# Patient Record
Sex: MALE | Race: BLACK OR AFRICAN AMERICAN | Employment: FULL TIME | URBAN - METROPOLITAN AREA
[De-identification: names, ages, dates, MRNs, and addresses within clinical notes are randomized per-mention and may not be internally consistent; named-entity substitution may affect disease eponyms.]

---

## 2017-10-24 ENCOUNTER — OFFICE VISIT (OUTPATIENT)
Dept: FAMILY MEDICINE CLINIC | Facility: CLINIC | Age: 42
End: 2017-10-24

## 2017-10-24 VITALS
DIASTOLIC BLOOD PRESSURE: 80 MMHG | TEMPERATURE: 98 F | BODY MASS INDEX: 40.99 KG/M2 | HEIGHT: 65.5 IN | SYSTOLIC BLOOD PRESSURE: 128 MMHG | RESPIRATION RATE: 18 BRPM | OXYGEN SATURATION: 98 % | WEIGHT: 249 LBS | HEART RATE: 95 BPM

## 2017-10-24 DIAGNOSIS — K21.9 GERD WITHOUT ESOPHAGITIS: Primary | Chronic | ICD-10-CM

## 2017-10-24 DIAGNOSIS — E78.1 HYPERTRIGLYCERIDEMIA: ICD-10-CM

## 2017-10-24 DIAGNOSIS — E66.9 OBESITY (BMI 35.0-39.9 WITHOUT COMORBIDITY): Chronic | ICD-10-CM

## 2017-10-24 DIAGNOSIS — I47.1 PAROXYSMAL SVT (SUPRAVENTRICULAR TACHYCARDIA) (HCC): Chronic | ICD-10-CM

## 2017-10-24 DIAGNOSIS — R06.81 APNEA: ICD-10-CM

## 2017-10-24 DIAGNOSIS — Z86.79 HISTORY OF HYPERTENSION: ICD-10-CM

## 2017-10-24 PROCEDURE — 99204 OFFICE O/P NEW MOD 45 MIN: CPT | Performed by: FAMILY MEDICINE

## 2017-10-24 RX ORDER — OMEPRAZOLE 40 MG/1
40 CAPSULE, DELAYED RELEASE ORAL DAILY
Qty: 30 CAPSULE | Refills: 3 | Status: SHIPPED | OUTPATIENT
Start: 2017-10-24 | End: 2017-12-06

## 2017-10-24 RX ORDER — METOPROLOL SUCCINATE 25 MG/1
25 TABLET, EXTENDED RELEASE ORAL DAILY
Qty: 30 TABLET | Refills: 0 | Status: SHIPPED | OUTPATIENT
Start: 2017-10-24 | End: 2017-10-31

## 2017-10-24 NOTE — PATIENT INSTRUCTIONS
Perform labs fasting 8 hours with water or black coffee or or black tea diet  soda only prior to exam.    Tips to Control Acid Reflux    To control acid reflux, you’ll need to make some basic diet and lifestyle changes.  The simple steps outlined below ma There are muscles (esophageal sphincters) at both ends of the tube that carries food to your stomach (the esophagus). These muscles relax to let food pass. Then they tighten to keep stomach acid down.  When the lower esophageal sphincter (LES) doesn’t tight If your throat structures are too large or the muscles relax too much during sleep, the air passage may be partly blocked for a while (temporarily).  But over time the air gets past. As air from the nose or mouth passes around this blockage, the throat stru Most people with heart problems need to eat less salt (sodium). Reducing the amount of salt you eat may help control your blood pressure. The higher your blood pressure, the greater your risk for heart disease, stroke, blindness, and kidney problems.   At t © 1614-3416 [employer] [address]. 1407 [facility], [address] [place]. All rights reserved. This information is not intended as a substitute for professional medical care. Always follow your healthcare professional's instructions.         Low-Sal Ok: Ice cream, frozen yogurt, juice bars, gelatin, cookies and pies, sugar, honey, jelly, hard candy  Avoid: Most pies, cakes and cookies prepared or processed with salt; instant pudding  Drinks  Ok: Tea, coffee, fizzy (carbonated) drinks, juices  Avoid: F Eating for your heart doesn’t have to be hard or boring. You just need to know how to make healthier choices. The DASH eating plan has been developed to help you do just that. DASH stands for Dietary Approaches to Stop Hypertension.  It is a plan that has b Best choices: Lean poultry and fish. Trim away visible fat. Broil, grill, roast, or boil instead of frying. Remove skin from poultry before eating.  Limit how much red meat you eat.  Nuts, seeds, beans  Servings: 4 to 5 a week  A serving is:  · One-third cu PAT can occur in otherwise healthy people who have used too much of a stimulant like tobacco or caffeine. Caffeine is found in coffee, tea, cola and some medicines.  Some over-the-counter cold and sinus remedies, diet pills, and some herbal supplements can

## 2017-10-25 NOTE — PROGRESS NOTES
CHIEF COMPLAINT: Patient presents with:  Establish Care: F/U;elevated Heart Rate         HPI:     Junita Skiff is a 35year old male presents for establish care.     H/o PSVT 10/19/2017 taken to [facility] due to left arm pain, chest pressure and p Outpatient Prescriptions:  Omeprazole 40 MG Oral Capsule Delayed Release Take 1 capsule (40 mg total) by mouth daily. Disp: 30 capsule Rfl: 3   Metoprolol Succinate ER 25 MG Oral Tablet 24 Hr Take 1 tablet (25 mg total) by mouth daily.  Disp: 30 tablet Rfl: Month(s) from this visit.    Latest known visit with results is:   Admission on 07/22/2016, Discharged on 07/23/2016   Component Date Value   • Ventricular rate 07/23/2016 85    • Atrial rate 07/23/2016 85    • P-R Interval 07/23/2016 156    • QRS Duration Monocyte % 07/22/2016 7.7    • Eosinophil % 07/22/2016 3.6    • Basophil % 07/22/2016 0.4    • Immature Granulocyte % 07/22/2016 0.2    • Potassium 07/22/2016 4.0    • AST 07/22/2016 29    • Glucose 07/23/2016 93    • BUN 07/23/2016 10    • Creatinine 07/2 weight  - OP REFERRAL TO DIAGNOSTIC SLEEP STUDY    Spent 45 mins with patient and fiance discussing symptoms, plan, treatment and answering questions. Pt and fiance understand plan and followup.     Meds This Visit:    Signed Prescriptions Disp Refills    O

## 2017-10-31 ENCOUNTER — APPOINTMENT (OUTPATIENT)
Dept: LAB | Age: 42
End: 2017-10-31
Attending: FAMILY MEDICINE
Payer: COMMERCIAL

## 2017-10-31 ENCOUNTER — OFFICE VISIT (OUTPATIENT)
Dept: FAMILY MEDICINE CLINIC | Facility: CLINIC | Age: 42
End: 2017-10-31

## 2017-10-31 VITALS
DIASTOLIC BLOOD PRESSURE: 62 MMHG | WEIGHT: 249 LBS | OXYGEN SATURATION: 98 % | TEMPERATURE: 98 F | BODY MASS INDEX: 41 KG/M2 | RESPIRATION RATE: 18 BRPM | SYSTOLIC BLOOD PRESSURE: 118 MMHG | HEART RATE: 61 BPM

## 2017-10-31 DIAGNOSIS — R06.83 SNORING: ICD-10-CM

## 2017-10-31 DIAGNOSIS — E78.1 HYPERTRIGLYCERIDEMIA: ICD-10-CM

## 2017-10-31 DIAGNOSIS — I10 ESSENTIAL HYPERTENSION: Chronic | ICD-10-CM

## 2017-10-31 DIAGNOSIS — K21.9 GERD WITHOUT ESOPHAGITIS: Chronic | ICD-10-CM

## 2017-10-31 DIAGNOSIS — I47.1 PAROXYSMAL SVT (SUPRAVENTRICULAR TACHYCARDIA) (HCC): Primary | Chronic | ICD-10-CM

## 2017-10-31 PROCEDURE — 36415 COLL VENOUS BLD VENIPUNCTURE: CPT

## 2017-10-31 PROCEDURE — 80061 LIPID PANEL: CPT

## 2017-10-31 PROCEDURE — 99214 OFFICE O/P EST MOD 30 MIN: CPT | Performed by: FAMILY MEDICINE

## 2017-10-31 RX ORDER — METOPROLOL SUCCINATE 25 MG/1
25 TABLET, EXTENDED RELEASE ORAL DAILY
Qty: 30 TABLET | Refills: 6 | Status: SHIPPED | OUTPATIENT
Start: 2017-10-31 | End: 2017-12-05

## 2017-10-31 NOTE — PATIENT INSTRUCTIONS
Having Catheter Ablation  A heart rhythm problem (arrhythmia) can make your heart beat too fast or in an irregular pattern. The problem is often caused by cells in your heart that aren’t working as they should.  Or, it may be the result of an abnormal kami · Problem areas are destroyed using heat or cold therapy. Once the EPS shows where the problem is, the doctor threads an electrode catheter through a blood vessel to that area in the heart. Energy is sent through the catheter to destroy the problem cells. · Finishing up. When the procedure is finished, the provider takes the catheters out of your body. He or she puts pressure on the puncture sites to stop any bleeding. No stitches are needed.  You’re then taken to a recovery room to rest. You'll need to suresh PAT can occur in otherwise healthy people who have used too much of a stimulant like tobacco or caffeine. Caffeine is found in coffee, tea, cola and some medicines.  Some over-the-counter cold and sinus remedies, diet pills, and some herbal supplements can

## 2017-10-31 NOTE — PROGRESS NOTES
CHIEF COMPLAINT: Patient presents with: Follow - Up    HPI:     Jasiel Henriquez is a 35year old male presents for recheck blood pressure. No incidents palpitations. Doing well on metoprolol.   Initially felt sluggish and tired especially when takes it a Succinate ER 25 MG Oral Tablet 24 Hr Take 1 tablet (25 mg total) by mouth daily. Disp: 30 tablet Rfl: 0   naproxen 500 MG Oral Tab Take 1 tablet (500 mg total) by mouth 2 (two) times daily as needed.  Disp: 20 tablet Rfl: 0   Pantoprazole Sodium 40 MG Oral Hold Blue 07/22/2016 Auto Resulted    • Hold Gold 07/22/2016 Auto Resulted    • Hold Lavender 07/22/2016 Auto Resulted    • Hold Lt Green 07/22/2016 Auto Resulted    • Troponin 07/22/2016 <0.046    • Cholesterol, Total 07/22/2016 194    • Triglycerides 07/ 26.0    • WBC 07/23/2016 4.0    • RBC 07/23/2016 4.85    • HGB 07/23/2016 13.8    • HCT 07/23/2016 42.2    • PLT 07/23/2016 222.0    • MCV 07/23/2016 87.0    • MCH 07/23/2016 28.5    • MCHC 07/23/2016 32.7    • RDW 07/23/2016 13.3    • RDW-SD 07/23/2016 42 (supraventricular tachycardia) (HCC)     Obesity (BMI 35.0-39.9 without comorbidity)     Hypertriglyceridemia     History of hypertension      Imaging & Referrals:  None     10/31/2017  Authur Gosselin, DO      Patient understands plan and follow-up.   Return

## 2017-11-01 ENCOUNTER — TELEPHONE (OUTPATIENT)
Dept: FAMILY MEDICINE CLINIC | Facility: CLINIC | Age: 42
End: 2017-11-01

## 2017-11-01 NOTE — TELEPHONE ENCOUNTER
Spoke with pt reviewed results and recommendations, pt verbalized understanding    Notes Recorded by Bijan Garcia DO on 10/31/2017 at 9:20 PM CDT  Lipid panel is mildly elevated. Please change diet  start exercising.   Start exercise to 3-5 times a week

## 2017-11-06 ENCOUNTER — TELEPHONE (OUTPATIENT)
Dept: FAMILY MEDICINE CLINIC | Facility: CLINIC | Age: 42
End: 2017-11-06

## 2017-11-06 NOTE — TELEPHONE ENCOUNTER
Spoke to patient regarding FMLA forms, patient needs forms for when he must seek follow up visits/ appts for SVT. Forms in progress.

## 2017-11-06 NOTE — TELEPHONE ENCOUNTER
Spoke to patient, forms completed and faxed to [phone] with barcode cover sheet. Will place copy at .

## 2017-11-13 ENCOUNTER — OFFICE VISIT (OUTPATIENT)
Dept: SLEEP CENTER | Facility: HOSPITAL | Age: 42
End: 2017-11-13
Attending: FAMILY MEDICINE
Payer: COMMERCIAL

## 2017-11-13 PROCEDURE — 95811 POLYSOM 6/>YRS CPAP 4/> PARM: CPT

## 2017-11-15 NOTE — PROCEDURES
[address] [address],[facility] [address]       Accredited by the [place] of Sleep Medicine (AASM)    PATIENT'S NAME:        Albania Armenta  ATTENDING PHYSICIAN:   Aylin Stapleton M.D.   REFERRING PHYSICIAN:   HONORIO Ingram of 98%. Sleep stage breakdown as follows:  Stage 1, 1%; stage 2, 81%; stage 3, 5%; stage REM 13%. During CPAP titration, total recording time was 278 minutes, total sleep time was 265 minutes.   Sleep onset latency was 2 minutes and sleep efficiency was position. RECOMMENDATIONS:    1. The patient should be initiated on auto-Pap at 10 to 20 cm of water pressure with A-Flex level 2, humidity level 3. During this study, patient used a [employer] size large mask.   The use of an auto-titrati

## 2017-11-16 ENCOUNTER — TELEPHONE (OUTPATIENT)
Dept: FAMILY MEDICINE CLINIC | Facility: CLINIC | Age: 42
End: 2017-11-16

## 2017-11-16 NOTE — TELEPHONE ENCOUNTER
Spoke with pt, reviewed results and recommendations, provided contact information for Dr Sendy Little.

## 2017-11-16 NOTE — TELEPHONE ENCOUNTER
LMOM to return call to the office. Provided pt office phone [phone] along with office hours given. Notes Recorded by Alexis Wynn DO on 11/15/2017 at 9:45 PM CST  Severe BRIT. Call pt.  Needs to schedule follow up with Dr. Seda Anglin and discuss Cpa

## 2017-11-17 ENCOUNTER — TELEPHONE (OUTPATIENT)
Dept: FAMILY MEDICINE CLINIC | Facility: CLINIC | Age: 42
End: 2017-11-17

## 2017-12-05 ENCOUNTER — OFFICE VISIT (OUTPATIENT)
Dept: FAMILY MEDICINE CLINIC | Facility: CLINIC | Age: 42
End: 2017-12-05

## 2017-12-05 VITALS
WEIGHT: 248 LBS | SYSTOLIC BLOOD PRESSURE: 122 MMHG | RESPIRATION RATE: 18 BRPM | DIASTOLIC BLOOD PRESSURE: 80 MMHG | TEMPERATURE: 98 F | BODY MASS INDEX: 39 KG/M2 | HEART RATE: 82 BPM | OXYGEN SATURATION: 97 %

## 2017-12-05 DIAGNOSIS — I10 ESSENTIAL HYPERTENSION: Primary | Chronic | ICD-10-CM

## 2017-12-05 DIAGNOSIS — G47.33 SEVERE OBSTRUCTIVE SLEEP APNEA: ICD-10-CM

## 2017-12-05 DIAGNOSIS — I47.1 PAROXYSMAL SVT (SUPRAVENTRICULAR TACHYCARDIA) (HCC): Chronic | ICD-10-CM

## 2017-12-05 PROCEDURE — 99214 OFFICE O/P EST MOD 30 MIN: CPT | Performed by: FAMILY MEDICINE

## 2017-12-05 RX ORDER — METOPROLOL SUCCINATE 50 MG/1
50 TABLET, EXTENDED RELEASE ORAL DAILY
Qty: 30 TABLET | Refills: 2 | Status: SHIPPED | OUTPATIENT
Start: 2017-12-05 | End: 2018-01-15

## 2017-12-05 NOTE — PROGRESS NOTES
CHIEF COMPLAINT: Patient presents with: Follow - Up: ER; elevated heart rate 180bpm        HPI:     Cesar Shankar is a 38year old male presents for follow-up [facility] ER visit last night.   States his heart rate was up in the 180s went to the emergency angel tablet (50 mg total) by mouth daily. Disp: 30 tablet Rfl: 2   Omeprazole 40 MG Oral Capsule Delayed Release Take 1 capsule (40 mg total) by mouth daily.  Disp: 30 capsule Rfl: 3   naproxen 500 MG Oral Tab Take 1 tablet (500 mg total) by mouth 2 (two) times symptoms on metoprolol 50 mg. If blood pressure too low with little dizzy, lightheaded and fatigue, if occur call office and needs blood pressure check. - Metoprolol Succinate ER 50 MG Oral Tablet 24 Hr; Take 1 tablet (50 mg total) by mouth daily.   Dispe plan and follow-up. Return in about 1 month (around 1/5/2018) for recheck symptoms.

## 2017-12-05 NOTE — PATIENT INSTRUCTIONS
Understanding Supraventricular Tachycardia  Supraventricular tachycardia (SVT) is a type of abnormal heart rhythm that results in fast heartbeats. The heart normally beats 60 to 100 beats per minute while you are at rest and awake.  With SVT, the heart be When you have SVT, the signal to start your heartbeat doesn’t come from the SA node. Instead it comes from another part of the left or right atrium. Or it comes from the AV node.  Some area outside the SA node begins to fire quickly, causing a rapid heartbe · Multifocal atrial tachycardia. Multiple groups of cells in your atria fire abnormally and trigger a fast heartbeat. What causes SVT? Some types of SVT run in families. Some people have heart problems from birth that cause SVT.  High blood pressure, hear © 1803-3767 [employer] [address]. 1407 [facility], [facility] [place]. All rights reserved. This information is not intended as a substitute for professional medical care. Always follow your healthcare professional's instructions.         Treatme · Catheter ablation. This can help permanently stop SVT. Your healthcare provider puts a thin, flexible tube (catheter) into a blood vessel in the groin. He or she then gently pushes it up into your heart.  The area of your heart that causes your SVT is the

## 2017-12-12 ENCOUNTER — OFFICE VISIT (OUTPATIENT)
Dept: FAMILY MEDICINE CLINIC | Facility: CLINIC | Age: 42
End: 2017-12-12

## 2017-12-12 ENCOUNTER — LAB ENCOUNTER (OUTPATIENT)
Dept: LAB | Age: 42
End: 2017-12-12
Attending: FAMILY MEDICINE
Payer: COMMERCIAL

## 2017-12-12 VITALS
OXYGEN SATURATION: 98 % | RESPIRATION RATE: 18 BRPM | TEMPERATURE: 98 F | SYSTOLIC BLOOD PRESSURE: 124 MMHG | HEIGHT: 65.7 IN | HEART RATE: 80 BPM | WEIGHT: 245 LBS | DIASTOLIC BLOOD PRESSURE: 86 MMHG | BODY MASS INDEX: 39.85 KG/M2

## 2017-12-12 DIAGNOSIS — Z13.0 SCREENING FOR ENDOCRINE, NUTRITIONAL, METABOLIC AND IMMUNITY DISORDER: ICD-10-CM

## 2017-12-12 DIAGNOSIS — Z13.0 SCREENING FOR IRON DEFICIENCY ANEMIA: ICD-10-CM

## 2017-12-12 DIAGNOSIS — Z00.00 ANNUAL PHYSICAL EXAM: ICD-10-CM

## 2017-12-12 DIAGNOSIS — Z13.21 SCREENING FOR ENDOCRINE, NUTRITIONAL, METABOLIC AND IMMUNITY DISORDER: ICD-10-CM

## 2017-12-12 DIAGNOSIS — Z13.29 SCREENING FOR ENDOCRINE, NUTRITIONAL, METABOLIC AND IMMUNITY DISORDER: ICD-10-CM

## 2017-12-12 DIAGNOSIS — Z12.11 SCREENING FOR COLON CANCER: ICD-10-CM

## 2017-12-12 DIAGNOSIS — Z13.6 SCREENING FOR HEART DISEASE: ICD-10-CM

## 2017-12-12 DIAGNOSIS — Z00.00 ANNUAL PHYSICAL EXAM: Primary | ICD-10-CM

## 2017-12-12 DIAGNOSIS — Z13.228 SCREENING FOR ENDOCRINE, NUTRITIONAL, METABOLIC AND IMMUNITY DISORDER: ICD-10-CM

## 2017-12-12 PROCEDURE — 80053 COMPREHEN METABOLIC PANEL: CPT

## 2017-12-12 PROCEDURE — 90472 IMMUNIZATION ADMIN EACH ADD: CPT | Performed by: FAMILY MEDICINE

## 2017-12-12 PROCEDURE — 90715 TDAP VACCINE 7 YRS/> IM: CPT | Performed by: FAMILY MEDICINE

## 2017-12-12 PROCEDURE — 85025 COMPLETE CBC W/AUTO DIFF WBC: CPT

## 2017-12-12 PROCEDURE — 80061 LIPID PANEL: CPT

## 2017-12-12 PROCEDURE — 36415 COLL VENOUS BLD VENIPUNCTURE: CPT

## 2017-12-12 PROCEDURE — 99396 PREV VISIT EST AGE 40-64: CPT | Performed by: FAMILY MEDICINE

## 2017-12-12 PROCEDURE — 90471 IMMUNIZATION ADMIN: CPT | Performed by: FAMILY MEDICINE

## 2017-12-12 PROCEDURE — 86735 MUMPS ANTIBODY: CPT

## 2017-12-12 PROCEDURE — 82306 VITAMIN D 25 HYDROXY: CPT

## 2017-12-12 PROCEDURE — 86762 RUBELLA ANTIBODY: CPT

## 2017-12-12 PROCEDURE — 84443 ASSAY THYROID STIM HORMONE: CPT

## 2017-12-12 PROCEDURE — 90686 IIV4 VACC NO PRSV 0.5 ML IM: CPT | Performed by: FAMILY MEDICINE

## 2017-12-12 PROCEDURE — 86765 RUBEOLA ANTIBODY: CPT

## 2017-12-12 NOTE — PATIENT INSTRUCTIONS
My fitness pal carlos by [employer]- download and track all food and drinks  -limit 70g carbohydrate daily  Limit 1800 calories daily  -Evaluated regular meals which are high in carbohydrate with poor nutrition and lack of vegetables  -Needs exercise 5 time 25-hydroxyvitamin D (25-high-DROX-ee-VIE-tuh-min D), 25(OH)D  What is this test?  Vitamin D is mainly found in fortified dairy foods, juice, breakfast cereal, and certain fish. This vitamin plays many roles in the body.  But because it helps the body absorb A result for a lab test may be affected by many things, including the method the laboratory uses to do the test. If your test results are different from the normal value, you may not have a problem.  To learn what the results mean for you, talk with your he © 6376-6424 [employer] [address]. 1407 [facility] [place], [facility] [place]. All rights reserved. This information is not intended as a substitute for professional medical care. Always follow your healthcare professional's instructions.         Prevent Vision All men in this age group Every 2 to 4 years if no risk factors for eye disease2   Vaccine Who needs it How often   Chickenpox (varicella) All men in this age group who have no record of this infection or vaccine 2 doses; the second dose should be g Use of daily aspirin Men ages 39 to 78 at risk for cardiovascular health problems At routine exams   Use of tobacco and the health effects it can cause All men in this age group Every exam   1National Comprehensive Cancer [facility] 2330 years old: 1,000 mg  31-55 years old: 1,000 mg  53-79 years old, women: 1,200 mg  53-79 years old, men: 1,000 mg  Pregnant or nursin-34 years old: 1,300 mg, 24-51 years old: 1,000 mg  Older than 79 (women and men): 1,200 mg   Date Last Reviewed: © 0996-4558 [employer] [address]. [address] [facility], [facility] [place]. All rights reserved. This information is not intended as a substitute for professional medical care. Always follow your healthcare professional's instructions.

## 2017-12-12 NOTE — PROGRESS NOTES
Patient presents with:  Physical: labs      REASON FOR VISIT:    Iraj Rothman is a 38year old male who presents for an [address]. Awaiting cpap machine -sleep apnea severe. SVT arythmia mostly at night time.  Seen , lore fo 06/30/2016       Lab Results  Component Value Date   HDL 26 (L) 10/31/2017   HDL 30 (L) 07/22/2016   HDL 36 (L) 06/30/2016     No results found for: TRIGLY    Lab Results  Component Value Date    (H) 10/31/2017    07/22/2016    (H) 06/ results found for this or any previous visit. Fecal Occult Blood  Annually No results found for: FOB, OCCULTSTOOL    Obesity Screening Screen all adults annually Body mass index is 39.91 kg/m².       Preventive Services for Which Recommendations Vary wit Cholesterol (mg/dL)   Date Value   10/31/2017 141 (H)    No flowsheet data found. Dilated Eye exam  Annually No flowsheet data found. No flowsheet data found.     Asthma  (Annually between Nov. 1 & Dec. 31)    Date of last AAP/ACT and counseling given tobacco: Never Used                      Alcohol use: Yes           3.6 oz/week     Cans of beer: 6 per week    Occ:  : engaged       REVIEW OF SYSTEMS:   GENERAL: feels well otherwise  SKIN: denies any unusual skin lesions  EYES: denies blurred vis sugary drinks and sodas. Diet should include lean meats and vegetables including 5-7 servings of fruit and vegetables total in 1 day.   Never skip breakfast.  -Encouraged exercise 30 minutes to 60 minutes 3-5 times daily to prevent obesity and chronic dise

## 2018-01-09 ENCOUNTER — PATIENT OUTREACH (OUTPATIENT)
Dept: FAMILY MEDICINE CLINIC | Facility: CLINIC | Age: 43
End: 2018-01-09

## 2018-01-09 NOTE — PROGRESS NOTES
I LVM regarding second NO SHOW status for office visit on 1/9/18 with Dr. Travis Medrano. I notified patient since this is their second no show that a $50 fee will be billed to them. Patient will be charged for any future NO SHOW appointments.  No Show lette

## 2018-01-15 ENCOUNTER — OFFICE VISIT (OUTPATIENT)
Dept: FAMILY MEDICINE CLINIC | Facility: CLINIC | Age: 43
End: 2018-01-15

## 2018-01-15 VITALS
DIASTOLIC BLOOD PRESSURE: 80 MMHG | BODY MASS INDEX: 41 KG/M2 | RESPIRATION RATE: 16 BRPM | SYSTOLIC BLOOD PRESSURE: 126 MMHG | WEIGHT: 252.81 LBS | HEART RATE: 110 BPM | OXYGEN SATURATION: 98 % | TEMPERATURE: 99 F

## 2018-01-15 DIAGNOSIS — E55.9 VITAMIN D DEFICIENCY: Primary | ICD-10-CM

## 2018-01-15 DIAGNOSIS — G47.33 SEVERE OBSTRUCTIVE SLEEP APNEA: ICD-10-CM

## 2018-01-15 DIAGNOSIS — I47.1 PAROXYSMAL SVT (SUPRAVENTRICULAR TACHYCARDIA) (HCC): Chronic | ICD-10-CM

## 2018-01-15 DIAGNOSIS — I10 ESSENTIAL HYPERTENSION: Chronic | ICD-10-CM

## 2018-01-15 DIAGNOSIS — E78.5 DYSLIPIDEMIA: ICD-10-CM

## 2018-01-15 DIAGNOSIS — R68.82 LOW LIBIDO: ICD-10-CM

## 2018-01-15 PROCEDURE — 99214 OFFICE O/P EST MOD 30 MIN: CPT | Performed by: FAMILY MEDICINE

## 2018-01-15 PROCEDURE — 36415 COLL VENOUS BLD VENIPUNCTURE: CPT | Performed by: FAMILY MEDICINE

## 2018-01-15 PROCEDURE — 84403 ASSAY OF TOTAL TESTOSTERONE: CPT | Performed by: FAMILY MEDICINE

## 2018-01-15 PROCEDURE — 84270 ASSAY OF SEX HORMONE GLOBUL: CPT | Performed by: FAMILY MEDICINE

## 2018-01-15 RX ORDER — METOPROLOL SUCCINATE 50 MG/1
50 TABLET, EXTENDED RELEASE ORAL DAILY
Qty: 30 TABLET | Refills: 2 | Status: SHIPPED | OUTPATIENT
Start: 2018-01-15 | End: 2018-03-21

## 2018-01-15 NOTE — PROGRESS NOTES
CHIEF COMPLAINT: Patient presents with: Follow - Up: labs, BP         HPI:     Adele Naqvi is a 38year old male presents for follow-up labs and blood pressure check. Home monitoring blood pressures are in the 130 over 80s to 150/90.   Has not started status: Never Smoker                                                              Smokeless tobacco: Never Used                      Alcohol use: Yes           3.6 oz/week     Cans of beer: 6 per week       Medications (Active prior to today's visit):    C 12/12/2017 10    • Creatinine 12/12/2017 1.18    • GFR 12/12/2017 86    • Calcium, Total 12/12/2017 9.2    • Alkaline Phosphatase 12/12/2017 55    • AST 12/12/2017 24    • Alt 12/12/2017 37    • Bilirubin, Total 12/12/2017 0.3    • Total Protein 12/12/2017 niacin 500 mg at bedtime to help raise HDL along with exercise and increasing omega-3 and diet. - Please change diet  start exercising.   Start exercise to 3-5 times a week for 30-60 minutes which will improve youir cholesterol levels and protect your hear to [place] with significant other. Risk of MI on Viagra do not recommend using a friend's medication. Patient agrees.   - TESTOSTERONE, BIOAVAIL, SHBG, MALE; Future      Meds This Visit:    Signed Prescriptions Disp Refills    Metoprolol Succinate ER 50 MG

## 2018-01-15 NOTE — PATIENT INSTRUCTIONS
Buy over-the-counter vitamin D3  2000 IUs take 2 tablets daily ×12 weeks then 1 tablet daily. Recheck in 6-12 months. - Please change diet  start exercising.   Start exercise to 5 times a week for 30-60 minutes which will improve youir cholesterol lev salt. Note that foods with reduced salt may not lower your salt intake enough.     Beans, potatoes, and pasta  Ok: Dry beans, split peas, lentils, potatoes, rice, macaroni, pasta, spaghetti without added salt  Avoid: Potato chips, tortilla chips, and simila salted meats, regular soup and broth  Vegetables  Ok: Most vegetables are okay; also low-salt tomato and vegetable juices  Avoid: Sauerkraut and other brine-soaked vegetables; pickles and other pickled vegetables; tomato juice, olives  Date Last Reviewed:

## 2018-01-16 LAB
SEX HORMONE BINDING GLOBULIN: 65 NMOL/L
TESTOSTERONE, ADULT, MALE: 512 NG/DL
TESTOSTERONE, BIOAVAILABLE: 178 NG/DL
TESTOSTERONE, FREE, CALC: 62 PG/ML
TESTOSTERONE, PERCENT FREE: 1.2 %

## 2018-01-20 ENCOUNTER — HOSPITAL ENCOUNTER (EMERGENCY)
Facility: HOSPITAL | Age: 43
Discharge: HOME | End: 2018-01-20
Attending: EMERGENCY MEDICINE
Payer: COMMERCIAL

## 2018-01-20 VITALS
HEIGHT: 67 IN | WEIGHT: 250 LBS | RESPIRATION RATE: 18 BRPM | SYSTOLIC BLOOD PRESSURE: 105 MMHG | DIASTOLIC BLOOD PRESSURE: 81 MMHG | OXYGEN SATURATION: 97 % | HEART RATE: 82 BPM | BODY MASS INDEX: 39.24 KG/M2 | TEMPERATURE: 98 F

## 2018-01-20 DIAGNOSIS — I47.1 PAROXYSMAL SVT (SUPRAVENTRICULAR TACHYCARDIA) (HCC): Primary | Chronic | ICD-10-CM

## 2018-01-20 LAB
ATRIAL RATE: 133 BPM
ATRIAL RATE: 80 BPM
BASOPHILS # BLD AUTO: 0.03 X10(3) UL (ref 0–0.1)
BASOPHILS NFR BLD AUTO: 0.5 %
BUN BLD-MCNC: 8 MG/DL (ref 8–20)
CALCIUM BLD-MCNC: 8.6 MG/DL (ref 8.3–10.3)
CHLORIDE: 110 MMOL/L (ref 101–111)
CO2: 24 MMOL/L (ref 22–32)
CREAT BLD-MCNC: 1.01 MG/DL (ref 0.7–1.3)
EOSINOPHIL # BLD AUTO: 0.15 X10(3) UL (ref 0–0.3)
EOSINOPHIL NFR BLD AUTO: 2.6 %
ERYTHROCYTE [DISTWIDTH] IN BLOOD BY AUTOMATED COUNT: 13.3 % (ref 11.5–16)
GLUCOSE BLD-MCNC: 115 MG/DL (ref 70–99)
HCT VFR BLD AUTO: 43.7 % (ref 37–53)
HGB BLD-MCNC: 14.9 G/DL (ref 13–17)
IMMATURE GRANULOCYTE COUNT: 0.01 X10(3) UL (ref 0–1)
IMMATURE GRANULOCYTE RATIO %: 0.2 %
LARGE PLATELETS: PRESENT
LYMPHOCYTES # BLD AUTO: 3.97 X10(3) UL (ref 0.9–4)
LYMPHOCYTES NFR BLD AUTO: 69.6 %
MCH RBC QN AUTO: 29.2 PG (ref 27–33.2)
MCHC RBC AUTO-ENTMCNC: 34.1 G/DL (ref 31–37)
MCV RBC AUTO: 85.5 FL (ref 80–99)
MONOCYTES # BLD AUTO: 0.48 X10(3) UL (ref 0.1–0.6)
MONOCYTES NFR BLD AUTO: 8.4 %
MORPHOLOGY: NORMAL
NEUTROPHIL ABS PRELIM: 1.06 X10 (3) UL (ref 1.3–6.7)
NEUTROPHILS # BLD AUTO: 1.06 X10(3) UL (ref 1.3–6.7)
NEUTROPHILS NFR BLD AUTO: 18.7 %
P AXIS: 47 DEGREES
P-R INTERVAL: 166 MS
PLATELET # BLD AUTO: 239 10(3)UL (ref 150–450)
POTASSIUM SERPL-SCNC: 3.6 MMOL/L (ref 3.6–5.1)
Q-T INTERVAL: 304 MS
Q-T INTERVAL: 386 MS
QRS DURATION: 82 MS
QRS DURATION: 92 MS
QTC CALCULATION (BEZET): 445 MS
QTC CALCULATION (BEZET): 475 MS
R AXIS: 37 DEGREES
R AXIS: 77 DEGREES
RBC # BLD AUTO: 5.11 X10(6)UL (ref 4.3–5.7)
RED CELL DISTRIBUTION WIDTH-SD: 41.9 FL (ref 35.1–46.3)
SODIUM SERPL-SCNC: 141 MMOL/L (ref 136–144)
T AXIS: -63 DEGREES
T AXIS: 18 DEGREES
VENTRICULAR RATE: 147 BPM
VENTRICULAR RATE: 80 BPM
WBC # BLD AUTO: 5.7 X10(3) UL (ref 4–13)

## 2018-01-20 PROCEDURE — 85025 COMPLETE CBC W/AUTO DIFF WBC: CPT | Performed by: EMERGENCY MEDICINE

## 2018-01-20 PROCEDURE — 96374 THER/PROPH/DIAG INJ IV PUSH: CPT

## 2018-01-20 PROCEDURE — 80048 BASIC METABOLIC PNL TOTAL CA: CPT | Performed by: EMERGENCY MEDICINE

## 2018-01-20 PROCEDURE — 93010 ELECTROCARDIOGRAM REPORT: CPT

## 2018-01-20 PROCEDURE — 99284 EMERGENCY DEPT VISIT MOD MDM: CPT

## 2018-01-20 PROCEDURE — 93005 ELECTROCARDIOGRAM TRACING: CPT

## 2018-01-20 PROCEDURE — 99285 EMERGENCY DEPT VISIT HI MDM: CPT

## 2018-01-20 PROCEDURE — 96361 HYDRATE IV INFUSION ADD-ON: CPT

## 2018-01-20 RX ORDER — ADENOSINE 3 MG/ML
6 INJECTION, SOLUTION INTRAVENOUS ONCE
Status: COMPLETED | OUTPATIENT
Start: 2018-01-20 | End: 2018-01-20

## 2018-01-20 RX ORDER — ADENOSINE 3 MG/ML
INJECTION, SOLUTION INTRAVENOUS
Status: COMPLETED
Start: 2018-01-20 | End: 2018-01-20

## 2018-01-20 NOTE — ED INITIAL ASSESSMENT (HPI)
Pt presents to ED with complaint palpitations and chest pain since about 4am. Pt reports his BP was reading high so he took an extra dose of metoprolol.  Pt reports that helped his blood pressure but he continued to have palpitations

## 2018-01-20 NOTE — ED PROVIDER NOTES
Patient Seen in: [facility] Emergency Department    History   Patient presents with:  Arrythmia/Palpitations (cardiovascular)    Stated Complaint: PALPITATIONS    HPI    24-year-old male presents to the emergency department for evaluation of rapid h 39.16 kg/m²         Physical Exam    General appearance: This is a middle-aged male lying in a gurney. Vital signs were reviewed per nurse's notes. Monitor reveals a narrow complex tachycardia rate of 140-150. HEENT: Normocephalic atraumatic.   Anicteric flush and promptly converted into a normal sinus rhythm. EKG    Rate, intervals and axes as noted on EKG Report. Rate: 80  Rhythm: Sinus Rhythm  Reading: Normal EKG. Agree with EKG report.       The patient was placed on observation status until labora

## 2018-01-22 ENCOUNTER — TELEPHONE (OUTPATIENT)
Dept: FAMILY MEDICINE CLINIC | Facility: CLINIC | Age: 43
End: 2018-01-22

## 2018-01-22 NOTE — TELEPHONE ENCOUNTER
Spoke with pt, reviewed results and recommendations.  Pt verbalized understanding    Notes Recorded by Sang Arrington DO on 1/21/2018 at 6:11 PM CST  Testosterone levels are normal.  The percent free testosterone is slightly lower but would not indicate pa

## 2018-01-29 ENCOUNTER — TELEPHONE (OUTPATIENT)
Dept: FAMILY MEDICINE CLINIC | Facility: CLINIC | Age: 43
End: 2018-01-29

## 2018-01-29 NOTE — TELEPHONE ENCOUNTER
Patient needs  form filled out and called when forms are ready. Patient needs form to be completed before Feb 1st,2018. I advised patient to allow up to 48 to 72 hours for a call back with a status. I placed form in the triage bin for review.

## 2018-01-29 NOTE — TELEPHONE ENCOUNTER
Spoke to patient, form requests cpap download, informed patient form should be filled out by pulmonology. Patient states he sees Dr. Pio Rondon, faxed form to Dr Tamara Cabrera office at [phone].     Future Appointments  Date Time Provider Giorgi Lyman   3/

## 2018-03-21 ENCOUNTER — OFFICE VISIT (OUTPATIENT)
Dept: FAMILY MEDICINE CLINIC | Facility: CLINIC | Age: 43
End: 2018-03-21

## 2018-03-21 VITALS
HEART RATE: 82 BPM | DIASTOLIC BLOOD PRESSURE: 76 MMHG | WEIGHT: 252 LBS | BODY MASS INDEX: 39 KG/M2 | OXYGEN SATURATION: 99 % | SYSTOLIC BLOOD PRESSURE: 120 MMHG | RESPIRATION RATE: 18 BRPM | TEMPERATURE: 98 F

## 2018-03-21 DIAGNOSIS — G47.33 SEVERE OBSTRUCTIVE SLEEP APNEA: ICD-10-CM

## 2018-03-21 DIAGNOSIS — I10 ESSENTIAL HYPERTENSION: Primary | Chronic | ICD-10-CM

## 2018-03-21 DIAGNOSIS — I47.1 PAROXYSMAL SVT (SUPRAVENTRICULAR TACHYCARDIA) (HCC): Chronic | ICD-10-CM

## 2018-03-21 PROCEDURE — 99214 OFFICE O/P EST MOD 30 MIN: CPT | Performed by: FAMILY MEDICINE

## 2018-03-21 RX ORDER — OMEPRAZOLE 40 MG/1
CAPSULE, DELAYED RELEASE ORAL
COMMUNITY
Start: 2018-03-17 | End: 2018-06-19

## 2018-03-21 RX ORDER — METOPROLOL SUCCINATE 50 MG/1
50 TABLET, EXTENDED RELEASE ORAL DAILY
Qty: 90 TABLET | Refills: 1 | Status: SHIPPED | OUTPATIENT
Start: 2018-03-21 | End: 2018-05-16

## 2018-03-21 NOTE — PATIENT INSTRUCTIONS
Low-Salt Choices  Eating salt (sodium) can make your body retain too much water. Excess water makes your heart work harder. Canned, packaged, and frozen foods are easy to prepare. But they are often high in sodium.  Here are some ideas for low-salt foods High blood pressure (hypertension) is a chronic disease. Often, healthcare providers don’t know what causes it. But it can be caused by certain health conditions and medicines. If you have high blood pressure, you may not have any symptoms.  If you do have · Start an exercise program. Talk with your healthcare provider about the type of exercise program that would be best for you. It doesn't have to be hard. Even brisk walking for 20 minutes 3 times a week is a good form of exercise.   · Don’t take medicines · Sit with your back supported (don't sit on a couch or soft chair); keep your feet on the floor uncrossed. Place your arm on a solid flat surface (like a table) with the upper part of the arm at heart level.  Place the middle of the cuff directly above the · You have problems speaking or seeing   Date Last Reviewed: 12/1/2016  © 1689-3077 [employer] [address]. 1407 [facility] [place], [address] [place] [address]. All rights reserved. This information is not intended as a substitute for professional medical care.  A

## 2018-03-21 NOTE — PROGRESS NOTES
CHIEF COMPLAINT: Patient presents with: Follow - Up: BP        HPI:     Leah Gordon is a 38year old male presents for  blood pressure check. Doing well on higher dose of metoprolol XL 50 mg. Denies exercise intolerance, depression or side effects. Allergies:  No Known Allergies    PSFH elements reviewed from today and agreed except as otherwise stated in HPI.  ROS:     Review of Systems  Positive for stated complaint: Denies David, stress   Pertinent positives and negatives noted in the the H CPAP      Meds This Visit:    Signed Prescriptions Disp Refills    Metoprolol Succinate ER 50 MG Oral Tablet 24 Hr 90 tablet 1      Sig: Take 1 tablet (50 mg total) by mouth daily.            Health Maintenance:  Annual Depression Screen due on 12/12/2018

## 2018-05-16 DIAGNOSIS — I10 ESSENTIAL HYPERTENSION: Chronic | ICD-10-CM

## 2018-05-16 RX ORDER — METOPROLOL SUCCINATE 50 MG/1
50 TABLET, EXTENDED RELEASE ORAL DAILY
Qty: 90 TABLET | Refills: 1 | Status: SHIPPED
Start: 2018-05-16 | End: 2018-12-13

## 2018-05-16 NOTE — TELEPHONE ENCOUNTER
From: Gabriel Addison  Sent: 5/16/2018 11:41 AM CDT  Subject: Medication Renewal Request    Gabriel Addison would like a refill of the following medications:     Metoprolol Succinate ER 50 MG Oral Tablet 24 Hr [facility] [place]    [facility]erred [facility]

## 2018-06-19 ENCOUNTER — OFFICE VISIT (OUTPATIENT)
Dept: FAMILY MEDICINE CLINIC | Facility: CLINIC | Age: 43
End: 2018-06-19

## 2018-06-19 VITALS
DIASTOLIC BLOOD PRESSURE: 76 MMHG | OXYGEN SATURATION: 98 % | WEIGHT: 233 LBS | SYSTOLIC BLOOD PRESSURE: 112 MMHG | BODY MASS INDEX: 36 KG/M2 | HEART RATE: 81 BPM | TEMPERATURE: 98 F | RESPIRATION RATE: 18 BRPM

## 2018-06-19 DIAGNOSIS — I47.1 PAROXYSMAL SVT (SUPRAVENTRICULAR TACHYCARDIA) (HCC): Chronic | ICD-10-CM

## 2018-06-19 DIAGNOSIS — R00.2 HEART PALPITATIONS: Primary | ICD-10-CM

## 2018-06-19 PROCEDURE — 99213 OFFICE O/P EST LOW 20 MIN: CPT | Performed by: FAMILY MEDICINE

## 2018-06-19 RX ORDER — ASPIRIN 81 MG/1
81 TABLET, CHEWABLE ORAL
COMMUNITY
Start: 2018-06-18

## 2018-06-19 RX ORDER — OMEPRAZOLE 40 MG/1
40 CAPSULE, DELAYED RELEASE ORAL DAILY
Qty: 90 CAPSULE | Refills: 0 | Status: SHIPPED | OUTPATIENT
Start: 2018-06-19 | End: 2018-12-13 | Stop reason: ALTCHOICE

## 2018-06-19 NOTE — PROGRESS NOTES
CHIEF COMPLAINT: Patient presents with:  ER F/U: chest pressure; elevated HR;     HPI:     Darian Salmeron is a 38year old male presents for follow-up [facility] ER visit yesterday for palpitations with some chest pressure and elevated heart rate hap Brother    • No Known Problems Daughter    • No Known Problems Daughter    • No Known Problems Brother    • Heart Disorder Sister       Social History: Smoking status: Never Smoker                                                              Smokeless toba (supraventricular tachycardia) (HCC)  Suspect dehydration and has history of SVT  Lost 20 pounds and blood pressure is greatly improved and controlled on metoprolol 50 mg but possibly breaking through-first episode of PSVT and months.   Patient needs to fol

## 2018-06-19 NOTE — PATIENT INSTRUCTIONS
Understanding Heart Palpitations    Heart palpitations are a symptom. It’s the feeling you have when your heartbeat seems to be racing, pounding, skipping, or fluttering.  Heart palpitations are most often felt in the chest. Sometimes, they may also be fe · New symptoms, such as chest pain, shortness of breath, dizziness, or fainting   Date Last Reviewed: 5/1/2016  © 4090-1401 [employer] [address]. 1407 [facility], [address] [place] [address]. All rights reserved.  This information is not intended as a sub

## 2018-11-23 ENCOUNTER — IMAGING SERVICES (OUTPATIENT)
Dept: OTHER | Age: 43
End: 2018-11-23

## 2018-12-13 ENCOUNTER — OFFICE VISIT (OUTPATIENT)
Dept: FAMILY MEDICINE CLINIC | Facility: CLINIC | Age: 43
End: 2018-12-13
Payer: COMMERCIAL

## 2018-12-13 VITALS
WEIGHT: 242.19 LBS | SYSTOLIC BLOOD PRESSURE: 128 MMHG | OXYGEN SATURATION: 98 % | BODY MASS INDEX: 38.92 KG/M2 | DIASTOLIC BLOOD PRESSURE: 78 MMHG | HEART RATE: 89 BPM | RESPIRATION RATE: 16 BRPM | TEMPERATURE: 98 F | HEIGHT: 66 IN

## 2018-12-13 DIAGNOSIS — Z13.0 SCREENING FOR ENDOCRINE, NUTRITIONAL, METABOLIC AND IMMUNITY DISORDER: ICD-10-CM

## 2018-12-13 DIAGNOSIS — Z00.00 ANNUAL PHYSICAL EXAM: ICD-10-CM

## 2018-12-13 DIAGNOSIS — G47.33 OSA (OBSTRUCTIVE SLEEP APNEA): ICD-10-CM

## 2018-12-13 DIAGNOSIS — I10 ESSENTIAL HYPERTENSION: Chronic | ICD-10-CM

## 2018-12-13 DIAGNOSIS — Z13.29 SCREENING FOR ENDOCRINE, NUTRITIONAL, METABOLIC AND IMMUNITY DISORDER: ICD-10-CM

## 2018-12-13 DIAGNOSIS — Z13.228 SCREENING FOR ENDOCRINE, NUTRITIONAL, METABOLIC AND IMMUNITY DISORDER: ICD-10-CM

## 2018-12-13 DIAGNOSIS — K21.9 GERD WITHOUT ESOPHAGITIS: Primary | Chronic | ICD-10-CM

## 2018-12-13 DIAGNOSIS — Z13.220 SCREENING CHOLESTEROL LEVEL: ICD-10-CM

## 2018-12-13 DIAGNOSIS — Z13.21 SCREENING FOR ENDOCRINE, NUTRITIONAL, METABOLIC AND IMMUNITY DISORDER: ICD-10-CM

## 2018-12-13 DIAGNOSIS — I47.1 SVT (SUPRAVENTRICULAR TACHYCARDIA) (HCC): ICD-10-CM

## 2018-12-13 PROCEDURE — 99396 PREV VISIT EST AGE 40-64: CPT | Performed by: FAMILY MEDICINE

## 2018-12-13 RX ORDER — METOPROLOL SUCCINATE 50 MG/1
50 TABLET, EXTENDED RELEASE ORAL DAILY
Qty: 90 TABLET | Refills: 1 | Status: SHIPPED | OUTPATIENT
Start: 2018-12-13 | End: 2019-10-31

## 2018-12-13 RX ORDER — RANITIDINE 150 MG/1
150 TABLET ORAL 2 TIMES DAILY PRN
Qty: 60 TABLET | Refills: 2 | Status: SHIPPED | OUTPATIENT
Start: 2018-12-13 | End: 2019-12-27

## 2018-12-13 NOTE — PATIENT INSTRUCTIONS
Perform labs fasting 8 hours with water or black coffee or or black tea diet  soda only prior to exam.    -Encourage healthy diet of whole food and avoid processed food and sugary drinks and sodas.   Diet should include lean meats and vegetables including 5 heart attack  · Control diabetes, or reduce your risk of getting this disease  · Improve your heart and lung function  · Reach and maintain a healthy weight  · Make your muscles stronger and more limber so you can stay active  · Prevent falls and fractures risk of health problems, such as heart disease, high blood pressure, diabetes, and some types of cancer  · Managing your weight  · Helping you sleep better  · Preventing or relieving stress, depression, and back problems  · Boosting your energy  You need t it will be easier to control your weight. Date Last Reviewed: 3/1/2017  © 7778-4357 [employer]. 1407 [facility] [place] [address] [place] [address]. All rights reserved. This information is not intended as a substitute for professional medical care. disease  · Psoriasis  · Asthma  · Weakness or falls    What other tests might I have along with this test?  A healthcare provider may also want to check your parathyroid hormone levels and your calcium levels.   What do my test results mean?   Test results provider knows about all medicines, herbs, vitamins, and supplements you are taking. This includes medicines that don't need a prescription and any illicit drugs you may use.   © 9114-7847 [employer].  1407 [facility] [place], [address]. old: 1,300 mg, 24-51 years old: 1,000 mg  Older than 79 (women and men): 1,200 mg   Date Last Reviewed: 10/17/2015  © 8046-4703 [employer] [address]. 1407 [facility] [place], [address] [place] [address]. All rights reserved.  This information is not intended as a

## 2018-12-13 NOTE — PROGRESS NOTES
Patient presents with:  Physical-Other: annual physical, pt states no complaints or concerns  Other: needs sleep apnea forms completed for work      REASON FOR VISIT:    Darwin Talbert is a 37year old male who presents for an [address]. 10/31/2017    CHOLEST 194 07/22/2016     Lab Results   Component Value Date    HDL 34 (L) 12/12/2017    HDL 26 (L) 10/31/2017    HDL 30 (L) 07/22/2016     No results found for: TRIGLY  Lab Results   Component Value Date     (H) 12/12/2017     Flex Sigmoidoscopy Screen  Every 5 years No results found for this or any previous visit. Fecal Occult Blood  Annually No results found for: FOB, OCCULTSTOOL    Obesity Screening Screen all adults annually Body mass index is 39.09 kg/m².       Sara Lin Date Value   01/20/2018 1.01        LDL  Annually LDL Cholesterol (mg/dL)   Date Value   12/12/2017 151 (H)    No flowsheet data found. Dilated Eye exam  Annually No flowsheet data found. No flowsheet data found.     Asthma  (Annually between Nov. 1 weekends    Drug use: No    Occ:   : engaged       REVIEW OF SYSTEMS:   GENERAL: feels well otherwise  SKIN: denies any unusual skin lesions  EYES: denies blurred vision or double vision  HEENT: denies nasal congestion, sinus pain or of whole food and avoid processed food and sugary drinks and sodas. Diet should include lean meats and vegetables including 5-7 servings of fruit and vegetables total in 1 day.   Never skip breakfast.  -Encouraged exercise 30 minutes to 60 minutes 3-5 time in about 1 month (around 1/13/2019) for discuss labs if needed, otherwise 6 months recheck blood pressure.     Diet counseling perfomed  Exercise counseling perfomed    SUGGESTED VACCINATIONS - Influenza, Pneumococcal, Zoster, Tetanus     Immunization Histo

## 2018-12-20 NOTE — LETTER
7/1/2025    Noman Reyes  [address]  [facility] [address]    Dear Noman,    Our records indicate you did not show for your appointment on 7/1/25.    To accommodate all our patients, we request at least 24 hours' notice if you need to cancel or reschedule your appointment.    If three appointments are missed within a 12-month period, we may begin the dismissal process for future care at [facility].    We value the relationship we have established with you.  We hope to continue to serve your health care needs.        [facility]  
inpatient Medical/Surgical team

## 2019-01-24 ENCOUNTER — IMAGING SERVICES (OUTPATIENT)
Dept: OTHER | Age: 44
End: 2019-01-24

## 2019-04-07 DIAGNOSIS — I10 ESSENTIAL HYPERTENSION: Chronic | ICD-10-CM

## 2019-04-08 RX ORDER — METOPROLOL SUCCINATE 50 MG/1
TABLET, EXTENDED RELEASE ORAL
Qty: 90 TABLET | Refills: 0 | OUTPATIENT
Start: 2019-04-08

## 2019-04-08 NOTE — TELEPHONE ENCOUNTER
Pt requesting refill of Metoprolol, protocol failed, unable to approve:     Last Time Medication was Filled:  12/31/2018 #90 with 1 refill  Last Office Visit with PCP:  12/31/2018       No future appointments.

## 2019-04-08 NOTE — TELEPHONE ENCOUNTER
S/w pharmacy states pt has refills on file for metoprolol, refill for metoprolol denied, Larwence Mcburney at pharmacy states will fill for pt.     Informed pt this refill would be denied bc medication refill was already on file at the pharmacy and will fill for pt, a

## 2019-05-05 ENCOUNTER — EXTERNAL RECORD (OUTPATIENT)
Dept: OTHER | Age: 44
End: 2019-05-05

## 2019-05-05 LAB
CHOLESTEROL HDL RATIO: 7.1
CHOLESTEROL: 178 MG/DL
HDL CHOLESTEROL: 25 MG/DL
LDL CHOLESTEROL DIRECT: 103 MG/DL (ref 0–129)
NON HDL CHOLESTEROL: 153 MG/DL
TRIGLYCERIDES: 252 MG/DL
TROPONIN I (TROP): < 0.03 NG/ML (ref 0–0.04)
VLDL CHOLESTEROL: 50 MG/DL (ref 5–30)

## 2019-05-06 ENCOUNTER — EXTERNAL RECORD (OUTPATIENT)
Dept: CARDIOLOGY | Age: 44
End: 2019-05-06

## 2019-05-06 LAB
ESTIMATED AVERAGE GLUCOSE: 117 MG/DL
HEMOGLOBIN A1C (GLYCOSYLATED): 5.7 %

## 2019-05-07 ENCOUNTER — EXTERNAL RECORD (OUTPATIENT)
Dept: OTHER | Age: 44
End: 2019-05-07

## 2019-10-24 ENCOUNTER — APPOINTMENT (OUTPATIENT)
Dept: GENERAL RADIOLOGY | Facility: HOSPITAL | Age: 44
End: 2019-10-24
Attending: EMERGENCY MEDICINE
Payer: COMMERCIAL

## 2019-10-24 ENCOUNTER — HOSPITAL ENCOUNTER (EMERGENCY)
Facility: HOSPITAL | Age: 44
Discharge: HOME | End: 2019-10-24
Attending: EMERGENCY MEDICINE
Payer: COMMERCIAL

## 2019-10-24 ENCOUNTER — APPOINTMENT (OUTPATIENT)
Dept: GENERAL RADIOLOGY | Facility: HOSPITAL | Age: 44
End: 2019-10-24
Payer: COMMERCIAL

## 2019-10-24 VITALS
HEIGHT: 67 IN | HEART RATE: 72 BPM | WEIGHT: 250 LBS | OXYGEN SATURATION: 97 % | DIASTOLIC BLOOD PRESSURE: 89 MMHG | RESPIRATION RATE: 18 BRPM | BODY MASS INDEX: 39.24 KG/M2 | TEMPERATURE: 98 F | SYSTOLIC BLOOD PRESSURE: 124 MMHG

## 2019-10-24 DIAGNOSIS — I47.1 SVT (SUPRAVENTRICULAR TACHYCARDIA) (HCC): Primary | ICD-10-CM

## 2019-10-24 PROCEDURE — 84484 ASSAY OF TROPONIN QUANT: CPT

## 2019-10-24 PROCEDURE — 80053 COMPREHEN METABOLIC PANEL: CPT | Performed by: EMERGENCY MEDICINE

## 2019-10-24 PROCEDURE — 93005 ELECTROCARDIOGRAM TRACING: CPT

## 2019-10-24 PROCEDURE — 93010 ELECTROCARDIOGRAM REPORT: CPT

## 2019-10-24 PROCEDURE — 71045 X-RAY EXAM CHEST 1 VIEW: CPT | Performed by: EMERGENCY MEDICINE

## 2019-10-24 PROCEDURE — 85025 COMPLETE CBC W/AUTO DIFF WBC: CPT | Performed by: EMERGENCY MEDICINE

## 2019-10-24 PROCEDURE — 99285 EMERGENCY DEPT VISIT HI MDM: CPT

## 2019-10-24 PROCEDURE — 99284 EMERGENCY DEPT VISIT MOD MDM: CPT

## 2019-10-24 PROCEDURE — 36415 COLL VENOUS BLD VENIPUNCTURE: CPT

## 2019-10-24 PROCEDURE — 80053 COMPREHEN METABOLIC PANEL: CPT

## 2019-10-24 PROCEDURE — 84484 ASSAY OF TROPONIN QUANT: CPT | Performed by: EMERGENCY MEDICINE

## 2019-10-24 PROCEDURE — 85025 COMPLETE CBC W/AUTO DIFF WBC: CPT

## 2019-10-24 RX ORDER — ASPIRIN 81 MG/1
324 TABLET, CHEWABLE ORAL ONCE
Status: DISCONTINUED | OUTPATIENT
Start: 2019-10-24 | End: 2019-10-24

## 2019-10-24 NOTE — ED PROVIDER NOTES
Patient Seen in: [facility] Emergency Department      History   Patient presents with:  Chest Pain Angina (cardiovascular)    Stated Complaint: cp    HPI    Patient is a pleasant 75-year-old male, with history of SVT, on metoprolol, presenting for e 7\")   Wt 113.4 kg   SpO2 97%   BMI 39.16 kg/m²       Physical Exam    Vital signs noted. GENERAL: Patient is awake and alert, resting comfortably on the cart, in no apparent distress. HEENT: Head is without evidence of trauma.  Extraocular muscles are GREEN   RAINBOW DRAW GOLD     EKG: The EKG revealed rate, intervals, and axis as noted on the EKG report. I have reviewed and agree with these readings. SVT at 150 bpm.  Nonspecific ST segment changes. EKG: The EKG revealed rate, intervals, and axis as not any problems, worsening symptoms, or as discussed. Patient verbalized understanding and is comfortable with the plan as recommended. Patient ambulated freely and was subsequently discharged without incident.         Disposition and Plan     Clinical Imp

## 2019-10-25 NOTE — PROGRESS NOTES
Please call for ER followup for SVT next week. Pt also needs to schedule FU with his cardiologist since he had an episode. Medication adjustment? . Pt has hx of PSVT and also sleep apnea on cpap.   Please call

## 2019-10-26 ENCOUNTER — LABORATORY ENCOUNTER (OUTPATIENT)
Dept: LAB | Age: 44
End: 2019-10-26
Attending: FAMILY MEDICINE
Payer: COMMERCIAL

## 2019-10-26 DIAGNOSIS — Z13.29 SCREENING FOR ENDOCRINE, NUTRITIONAL, METABOLIC AND IMMUNITY DISORDER: ICD-10-CM

## 2019-10-26 DIAGNOSIS — I10 ESSENTIAL HYPERTENSION: Chronic | ICD-10-CM

## 2019-10-26 DIAGNOSIS — Z00.00 ANNUAL PHYSICAL EXAM: ICD-10-CM

## 2019-10-26 DIAGNOSIS — Z13.220 SCREENING CHOLESTEROL LEVEL: ICD-10-CM

## 2019-10-26 DIAGNOSIS — Z13.228 SCREENING FOR ENDOCRINE, NUTRITIONAL, METABOLIC AND IMMUNITY DISORDER: ICD-10-CM

## 2019-10-26 DIAGNOSIS — Z13.21 SCREENING FOR ENDOCRINE, NUTRITIONAL, METABOLIC AND IMMUNITY DISORDER: ICD-10-CM

## 2019-10-26 DIAGNOSIS — Z13.0 SCREENING FOR ENDOCRINE, NUTRITIONAL, METABOLIC AND IMMUNITY DISORDER: ICD-10-CM

## 2019-10-26 PROCEDURE — 84443 ASSAY THYROID STIM HORMONE: CPT

## 2019-10-26 PROCEDURE — 85025 COMPLETE CBC W/AUTO DIFF WBC: CPT

## 2019-10-26 PROCEDURE — 80053 COMPREHEN METABOLIC PANEL: CPT

## 2019-10-26 PROCEDURE — 80061 LIPID PANEL: CPT

## 2019-10-31 ENCOUNTER — OFFICE VISIT (OUTPATIENT)
Dept: FAMILY MEDICINE CLINIC | Facility: CLINIC | Age: 44
End: 2019-10-31
Payer: COMMERCIAL

## 2019-10-31 VITALS
SYSTOLIC BLOOD PRESSURE: 126 MMHG | DIASTOLIC BLOOD PRESSURE: 80 MMHG | OXYGEN SATURATION: 99 % | WEIGHT: 248.81 LBS | HEIGHT: 67 IN | HEART RATE: 71 BPM | RESPIRATION RATE: 20 BRPM | BODY MASS INDEX: 39.05 KG/M2 | TEMPERATURE: 98 F

## 2019-10-31 DIAGNOSIS — Z80.0 FAMILY HISTORY OF COLON CANCER IN FATHER: ICD-10-CM

## 2019-10-31 DIAGNOSIS — R73.01 ELEVATED FASTING BLOOD SUGAR: Primary | ICD-10-CM

## 2019-10-31 DIAGNOSIS — E78.5 DYSLIPIDEMIA: ICD-10-CM

## 2019-10-31 DIAGNOSIS — I47.1 SVT (SUPRAVENTRICULAR TACHYCARDIA) (HCC): ICD-10-CM

## 2019-10-31 DIAGNOSIS — E66.01 CLASS 2 SEVERE OBESITY DUE TO EXCESS CALORIES WITH SERIOUS COMORBIDITY AND BODY MASS INDEX (BMI) OF 38.0 TO 38.9 IN ADULT (HCC): ICD-10-CM

## 2019-10-31 DIAGNOSIS — G47.33 SEVERE OBSTRUCTIVE SLEEP APNEA: ICD-10-CM

## 2019-10-31 DIAGNOSIS — Z12.11 SCREEN FOR COLON CANCER: ICD-10-CM

## 2019-10-31 DIAGNOSIS — I10 ESSENTIAL HYPERTENSION: Chronic | ICD-10-CM

## 2019-10-31 PROBLEM — E66.812 CLASS 2 SEVERE OBESITY DUE TO EXCESS CALORIES WITH SERIOUS COMORBIDITY AND BODY MASS INDEX (BMI) OF 38.0 TO 38.9 IN ADULT (HCC): Status: ACTIVE | Noted: 2019-10-31

## 2019-10-31 PROCEDURE — 99214 OFFICE O/P EST MOD 30 MIN: CPT | Performed by: FAMILY MEDICINE

## 2019-10-31 RX ORDER — METOPROLOL SUCCINATE 50 MG/1
50 TABLET, EXTENDED RELEASE ORAL DAILY
Qty: 90 TABLET | Refills: 0 | Status: ON HOLD | OUTPATIENT
Start: 2019-10-31 | End: 2019-12-31

## 2019-10-31 NOTE — PROGRESS NOTES
CHIEF COMPLAINT: Patient presents with:  ER F/U: Seen at [facility] for SVT   Test Results    HPI:     Jax Bolanos is a 37year old male presents for follow-up [facility] emergency room visit for SVT and medication monitoring/labs.     On 10/24/2019 patient wok blood pressure    • Obstructive apnea 11/13/2017    [facility] AHI 76 autoPAP 10-20 [employer]   • SVT (supraventricular tachycardia) [facility])       Past Surgical History:   Procedure Laterality Date   • UPPER GI EGD, HISTOLOGY - JAR(S): 6  11/2017    Dr Sandy Fabry 38.97 kg/m²   Vital signs reviewed. Appears stated age, well groomed. Physical Exam  GENERAL: well developed, well nourished,in no apparent distress  EYES; PERRLA, EOM-i B/L.  Sclera clear and non icteric bilateral  SKIN: no rashes,no suspicious lesions  HE Absolute Prel* 10/26/2019 1.19*   • Neutrophil Absolute 10/26/2019 1.19*   • Lymphocyte Absolute 10/26/2019 2.31    • Monocyte Absolute 10/26/2019 0.45    • Eosinophil Absolute 10/26/2019 0.20    • Basophil Absolute 10/26/2019 0.03    • Immature Granulocyt 10/24/2019 1.35*   • Neutrophil Absolute 10/24/2019 1.35*   • Lymphocyte Absolute 10/24/2019 3.92    • Monocyte Absolute 10/24/2019 0.46    • Eosinophil Absolute 10/24/2019 0.28    • Basophil Absolute 10/24/2019 0.04    • Immature Granulocyte Abs* 10/24/20 sleep apnea  Continue cpap-if gains 20 pounds weight may need repeat titration study    7. Essential hypertension  - Metoprolol Succinate ER 50 MG Oral Tablet 24 Hr; Take 1 tablet (50 mg total) by mouth daily. Dispense: 90 tablet;  Refill: 0  - COMP METABO

## 2019-10-31 NOTE — PATIENT INSTRUCTIONS
As of October 6th 2014, the Drug Enforcement Agency [facility]) is reclassifying all hydrocodone combination medications from Schedule III to Schedule II. This includes medications such as Norco, Vicodin, Lortab, Zohydro, and Vicoprofen.      What this means for daily   Exercise brisk walk 30 mins or more 5 or more days weekly   3 small meals and 2 snacks   Whole food as much as possible->3 veg daily, lean meat, fish, nuts, legumes, foods rich in omega 3, avocado oil, olive oil, salmon, almonds.  Avoid processed fo better. · Don't stop taking it when your cholesterol numbers improve. · Order your refill before your medicine runs out. Side effects  Medicines can cause side effects. These often occur at the start of taking a new medicine.  Side effects can include he medicines may cause. Let your provider know about any side effects you have. You may need to take more than one medicine to reach the cholesterol goals that you and your provider decide on.   Date Last Reviewed: 10/1/2016  © 9239-5123 The [employer], low-fat dairy, and using oils instead of solid fats. Limit baked goods, processed meats, and fried foods. A diet that’s high in these fats increases your bad cholesterol. It's not enough to just cut back on foods containing cholesterol.   · Eat about 2 serv instructions. Prediabetes  You have been diagnosed with prediabetes. This means that the level of sugar (glucose) in your blood is too high. If you have prediabetes, you are at risk for developing type 2 diabetes.  Type 2 diabetes is diagnosed when t (mg/dL) or lower. Prediabetes is 100 mg/dL to 125 mg/dL. Diabetes is 126 mg/dL or higher. · Glucose tolerance test. Your blood sugar is measured before and after you drink a very sugary liquid.  A normal test result is 139 milligrams per deciliter (mg/dL) Reviewed: 5/1/2016  © 9253-4393 [employer] [address]. 1407 [facility] [place], [facility] [place]. All rights reserved. This information is not intended as a substitute for professional medical care.  Always follow your healthcare professional's instruct your overall health. And take medicine if directed by your healthcare provider. Date Last Reviewed: 8/1/2018  © 8712-1547 [employer] [address]. 1407 [facility] [place], [address] [place] [address]. All rights reserved.  This information is not intended as a subs

## 2019-12-31 ENCOUNTER — HOSPITAL ENCOUNTER (OUTPATIENT)
Dept: INTERVENTIONAL RADIOLOGY/VASCULAR | Facility: HOSPITAL | Age: 44
Discharge: HOME | End: 2019-12-31
Attending: INTERNAL MEDICINE | Admitting: INTERNAL MEDICINE
Payer: COMMERCIAL

## 2019-12-31 VITALS
BODY MASS INDEX: 37.13 KG/M2 | TEMPERATURE: 98 F | SYSTOLIC BLOOD PRESSURE: 135 MMHG | RESPIRATION RATE: 21 BRPM | OXYGEN SATURATION: 100 % | WEIGHT: 245 LBS | HEART RATE: 68 BPM | DIASTOLIC BLOOD PRESSURE: 87 MMHG | HEIGHT: 68 IN

## 2019-12-31 DIAGNOSIS — I47.1 SVT (SUPRAVENTRICULAR TACHYCARDIA) (HCC): ICD-10-CM

## 2019-12-31 DIAGNOSIS — I10 ESSENTIAL HYPERTENSION: Chronic | ICD-10-CM

## 2019-12-31 PROCEDURE — 3E083KZ INTRODUCTION OF OTHER DIAGNOSTIC SUBSTANCE INTO HEART, PERCUTANEOUS APPROACH: ICD-10-PCS | Performed by: INTERNAL MEDICINE

## 2019-12-31 PROCEDURE — 4A023FZ MEASUREMENT OF CARDIAC RHYTHM, PERCUTANEOUS APPROACH: ICD-10-PCS | Performed by: INTERNAL MEDICINE

## 2019-12-31 PROCEDURE — 93653 COMPRE EP EVAL TX SVT: CPT

## 2019-12-31 PROCEDURE — 02583ZZ DESTRUCTION OF CONDUCTION MECHANISM, PERCUTANEOUS APPROACH: ICD-10-PCS | Performed by: INTERNAL MEDICINE

## 2019-12-31 PROCEDURE — 93621 COMP EP EVL L PAC&REC C SINS: CPT

## 2019-12-31 PROCEDURE — 4A0234Z MEASUREMENT OF CARDIAC ELECTRICAL ACTIVITY, PERCUTANEOUS APPROACH: ICD-10-PCS | Performed by: INTERNAL MEDICINE

## 2019-12-31 PROCEDURE — 99152 MOD SED SAME PHYS/QHP 5/>YRS: CPT

## 2019-12-31 PROCEDURE — 99153 MOD SED SAME PHYS/QHP EA: CPT

## 2019-12-31 PROCEDURE — 93623 PRGRMD STIMJ&PACG IV RX NFS: CPT

## 2019-12-31 PROCEDURE — 02K83ZZ MAP CONDUCTION MECHANISM, PERCUTANEOUS APPROACH: ICD-10-PCS | Performed by: INTERNAL MEDICINE

## 2019-12-31 PROCEDURE — 93613 INTRACARDIAC EPHYS 3D MAPG: CPT

## 2019-12-31 RX ORDER — HEPARIN SODIUM 5000 [USP'U]/ML
INJECTION, SOLUTION INTRAVENOUS; SUBCUTANEOUS
Status: COMPLETED
Start: 2019-12-31 | End: 2019-12-31

## 2019-12-31 RX ORDER — SODIUM CHLORIDE 9 MG/ML
INJECTION, SOLUTION INTRAVENOUS
Status: DISCONTINUED | OUTPATIENT
Start: 2020-01-01 | End: 2019-12-31 | Stop reason: HOSPADM

## 2019-12-31 RX ORDER — MIDAZOLAM HYDROCHLORIDE 1 MG/ML
INJECTION INTRAMUSCULAR; INTRAVENOUS
Status: COMPLETED
Start: 2019-12-31 | End: 2019-12-31

## 2019-12-31 RX ORDER — SODIUM CHLORIDE 9 MG/ML
INJECTION, SOLUTION INTRAVENOUS CONTINUOUS
Status: DISCONTINUED | OUTPATIENT
Start: 2019-12-31 | End: 2019-12-31

## 2019-12-31 RX ORDER — METOPROLOL SUCCINATE 50 MG/1
50 TABLET, EXTENDED RELEASE ORAL DAILY
Qty: 90 TABLET | Refills: 3 | Status: SHIPPED | OUTPATIENT
Start: 2019-12-31 | End: 2021-07-28

## 2019-12-31 RX ORDER — ISOPROTERENOL HYDROCHLORIDE 0.2 MG/ML
INJECTION, SOLUTION INTRAMUSCULAR; INTRAVENOUS
Status: COMPLETED
Start: 2019-12-31 | End: 2019-12-31

## 2019-12-31 RX ORDER — LIDOCAINE HYDROCHLORIDE 10 MG/ML
INJECTION, SOLUTION EPIDURAL; INFILTRATION; INTRACAUDAL; PERINEURAL
Status: COMPLETED
Start: 2019-12-31 | End: 2019-12-31

## 2019-12-31 NOTE — PROCEDURES
Electrophysiology Study with Radiofrequency Ablation      History:  Jax Bolanos is a 53year old male with recurrent supraventricular tachycardia who presents for an ablation.  The risks and benefits of the procedure (including, but not limited to, hema convert to SVT #2  Termination occurred without affecting the A  This was suggestive of atypical AVNRT    SVT #2 was only seen after SVT #1 was induced and then attempts were made to entrain SVT #1.  This occurred several times  SVT intervals: CL: 320, QRS:

## 2019-12-31 NOTE — PROGRESS NOTES
Patient received back from cath lab at 1030. Dr Ann-Marie Cramer spoke with patients family post procedure. Right and left groin sites with dressings in place; CDI, soft, no oozing or hematoma. After 2 hours, HOB increased and patient able to tolerated PO intake.

## 2019-12-31 NOTE — H&P
53year old male     He has had epsiodes of supraventricular tachycardia since he was a teenager     They have been getting worse and more frequent. He has them monthly. He has been in the ER several times this year requiring adenosine.  Other times they re PSYCHIATRIC: alert and oriented x 3, affect normal  SKIN: no rashes     Data:  · ECG: 10/14/2019: supraventricular tachycardia 150  · ECG: 10/14/2019: SR 86  · Echo: 11/2019: normal EF     Impression:  1. supraventricular tachycardia      Plan:  · Discusse

## 2020-01-13 ENCOUNTER — APPOINTMENT (OUTPATIENT)
Dept: LAB | Age: 45
End: 2020-01-13
Attending: FAMILY MEDICINE
Payer: COMMERCIAL

## 2020-01-13 LAB
ALBUMIN SERPL-MCNC: 3.9 G/DL (ref 3.4–5)
ALBUMIN/GLOB SERPL: 0.8 {RATIO} (ref 1–2)
ALP LIVER SERPL-CCNC: 57 U/L (ref 45–117)
ALT SERPL-CCNC: 39 U/L (ref 16–61)
ANION GAP SERPL CALC-SCNC: 4 MMOL/L (ref 0–18)
AST SERPL-CCNC: 22 U/L (ref 15–37)
BILIRUB SERPL-MCNC: 0.4 MG/DL (ref 0.1–2)
BUN BLD-MCNC: 8 MG/DL (ref 7–18)
BUN/CREAT SERPL: 6.8 (ref 10–20)
CALCIUM BLD-MCNC: 9.3 MG/DL (ref 8.5–10.1)
CHLORIDE SERPL-SCNC: 106 MMOL/L (ref 98–112)
CHOLEST SMN-MCNC: 188 MG/DL (ref ?–200)
CO2 SERPL-SCNC: 30 MMOL/L (ref 21–32)
CREAT BLD-MCNC: 1.18 MG/DL (ref 0.7–1.3)
EST. AVERAGE GLUCOSE BLD GHB EST-MCNC: 140 MG/DL (ref 68–126)
GLOBULIN PLAS-MCNC: 4.7 G/DL (ref 2.8–4.4)
GLUCOSE BLD-MCNC: 93 MG/DL (ref 70–99)
HBA1C MFR BLD HPLC: 6.5 % (ref ?–5.7)
HDLC SERPL-MCNC: 30 MG/DL (ref 40–59)
LDLC SERPL CALC-MCNC: 127 MG/DL (ref ?–100)
M PROTEIN MFR SERPL ELPH: 8.6 G/DL (ref 6.4–8.2)
NONHDLC SERPL-MCNC: 158 MG/DL (ref ?–130)
OSMOLALITY SERPL CALC.SUM OF ELEC: 288 MOSM/KG (ref 275–295)
PATIENT FASTING Y/N/NP: YES
PATIENT FASTING Y/N/NP: YES
POTASSIUM SERPL-SCNC: 5 MMOL/L (ref 3.5–5.1)
SODIUM SERPL-SCNC: 140 MMOL/L (ref 136–145)
TRIGL SERPL-MCNC: 155 MG/DL (ref 30–149)
VLDLC SERPL CALC-MCNC: 31 MG/DL (ref 0–30)

## 2020-02-04 ENCOUNTER — TELEPHONE (OUTPATIENT)
Dept: FAMILY MEDICINE CLINIC | Facility: CLINIC | Age: 45
End: 2020-02-04

## 2020-02-04 NOTE — TELEPHONE ENCOUNTER
LMOM for patient regarding NO SHOW status for office visit on 02/04/20 with Dr. Michela Sever. I informed patient our office has a $52.96 NO SHOW FEE POLICY. Patient will be charged $40.00 for any future NO SHOW appointments.

## 2020-03-13 ENCOUNTER — OFFICE VISIT (OUTPATIENT)
Dept: FAMILY MEDICINE CLINIC | Facility: CLINIC | Age: 45
End: 2020-03-13
Payer: COMMERCIAL

## 2020-03-13 VITALS
BODY MASS INDEX: 40.04 KG/M2 | TEMPERATURE: 98 F | HEIGHT: 67 IN | HEART RATE: 76 BPM | WEIGHT: 255.13 LBS | RESPIRATION RATE: 20 BRPM | OXYGEN SATURATION: 99 % | SYSTOLIC BLOOD PRESSURE: 134 MMHG | DIASTOLIC BLOOD PRESSURE: 70 MMHG

## 2020-03-13 DIAGNOSIS — H10.9 CONJUNCTIVITIS OF LEFT EYE, UNSPECIFIED CONJUNCTIVITIS TYPE: ICD-10-CM

## 2020-03-13 DIAGNOSIS — H57.9 SENSATION OF FOREIGN BODY IN EYE: Primary | ICD-10-CM

## 2020-03-13 PROCEDURE — 99213 OFFICE O/P EST LOW 20 MIN: CPT | Performed by: PHYSICIAN ASSISTANT

## 2020-03-13 RX ORDER — POLYMYXIN B SULFATE AND TRIMETHOPRIM 1; 10000 MG/ML; [USP'U]/ML
SOLUTION OPHTHALMIC
Qty: 10 ML | Refills: 0 | Status: SHIPPED | OUTPATIENT
Start: 2020-03-13 | End: 2021-08-25 | Stop reason: ALTCHOICE

## 2020-03-13 NOTE — PROGRESS NOTES
CHIEF COMPLAINT:   Patient presents with:  Eye Problem: left      HPI:   Isidro Mcadams is a 53year old male who presents with chief complaint of  Left eye problem. Symptoms began  This morning. Symptoms have been stable since onset.    Patient reports Used    Alcohol use:  Yes      Alcohol/week: 6.0 standard drinks      Types: 6 Cans of beer per week      Comment: on weekends    Drug use: No        REVIEW OF SYSTEMS:   GENERAL: feels well otherwise  SKIN: no rashes  EYES: See HPI  HENT: denies ear pain, B-Trimethoprim 78228-4.1 UNIT/ML-% Ophthalmic Solution 10 mL 0     Si gtt in eye(s) q 3hrs for 7 days       Risks, benefits, complications and side effects of meds discussed. Contagion measures reviewed. Warm compresses to affected eye prn.     Ca

## 2020-04-23 ENCOUNTER — TELEPHONE (OUTPATIENT)
Dept: FAMILY MEDICINE CLINIC | Facility: CLINIC | Age: 45
End: 2020-04-23

## 2020-04-23 NOTE — TELEPHONE ENCOUNTER
Daughter in law that lives in his house just test positive for corona virus  Son and other daughter shows no symptoms.  3 yo grandson in the house    Advised them to have daughter in law self quarantine until symptom free for 72 hours  Copy of patient instr

## 2020-07-08 ENCOUNTER — HOSPITAL ENCOUNTER (EMERGENCY)
Facility: HOSPITAL | Age: 45
Discharge: HOME | End: 2020-07-08
Attending: EMERGENCY MEDICINE
Payer: COMMERCIAL

## 2020-07-08 ENCOUNTER — APPOINTMENT (OUTPATIENT)
Dept: GENERAL RADIOLOGY | Facility: HOSPITAL | Age: 45
End: 2020-07-08
Payer: COMMERCIAL

## 2020-07-08 VITALS
SYSTOLIC BLOOD PRESSURE: 127 MMHG | RESPIRATION RATE: 20 BRPM | OXYGEN SATURATION: 97 % | DIASTOLIC BLOOD PRESSURE: 73 MMHG | BODY MASS INDEX: 40.18 KG/M2 | TEMPERATURE: 98 F | WEIGHT: 250 LBS | HEIGHT: 66 IN | HEART RATE: 74 BPM

## 2020-07-08 DIAGNOSIS — R03.0 ELEVATED BLOOD PRESSURE READING: ICD-10-CM

## 2020-07-08 DIAGNOSIS — R07.9 CHEST PAIN WITH LOW RISK FOR CARDIAC ETIOLOGY: Primary | ICD-10-CM

## 2020-07-08 LAB
ALBUMIN SERPL-MCNC: 3.8 G/DL (ref 3.4–5)
ALBUMIN/GLOB SERPL: 0.9 {RATIO} (ref 1–2)
ALP LIVER SERPL-CCNC: 54 U/L (ref 45–117)
ALT SERPL-CCNC: 31 U/L (ref 16–61)
ANION GAP SERPL CALC-SCNC: 5 MMOL/L (ref 0–18)
AST SERPL-CCNC: 23 U/L (ref 15–37)
ATRIAL RATE: 69 BPM
ATRIAL RATE: 91 BPM
BASOPHILS # BLD AUTO: 0.03 X10(3) UL (ref 0–0.2)
BASOPHILS NFR BLD AUTO: 0.5 %
BILIRUB SERPL-MCNC: 0.2 MG/DL (ref 0.1–2)
BUN BLD-MCNC: 16 MG/DL (ref 7–18)
BUN/CREAT SERPL: 14.5 (ref 10–20)
CALCIUM BLD-MCNC: 8.9 MG/DL (ref 8.5–10.1)
CHLORIDE SERPL-SCNC: 110 MMOL/L (ref 98–112)
CO2 SERPL-SCNC: 26 MMOL/L (ref 21–32)
CREAT BLD-MCNC: 1.1 MG/DL (ref 0.7–1.3)
DEPRECATED RDW RBC AUTO: 42.2 FL (ref 35.1–46.3)
EOSINOPHIL # BLD AUTO: 0.11 X10(3) UL (ref 0–0.7)
EOSINOPHIL NFR BLD AUTO: 1.7 %
ERYTHROCYTE [DISTWIDTH] IN BLOOD BY AUTOMATED COUNT: 13.4 % (ref 11–15)
GLOBULIN PLAS-MCNC: 4.4 G/DL (ref 2.8–4.4)
GLUCOSE BLD-MCNC: 113 MG/DL (ref 70–99)
HCT VFR BLD AUTO: 38.9 % (ref 39–53)
HGB BLD-MCNC: 12.9 G/DL (ref 13–17.5)
IMM GRANULOCYTES # BLD AUTO: 0.01 X10(3) UL (ref 0–1)
IMM GRANULOCYTES NFR BLD: 0.2 %
LYMPHOCYTES # BLD AUTO: 2.98 X10(3) UL (ref 1–4)
LYMPHOCYTES NFR BLD AUTO: 45.5 %
M PROTEIN MFR SERPL ELPH: 8.2 G/DL (ref 6.4–8.2)
MCH RBC QN AUTO: 28.8 PG (ref 26–34)
MCHC RBC AUTO-ENTMCNC: 33.2 G/DL (ref 31–37)
MCV RBC AUTO: 86.8 FL (ref 80–100)
MONOCYTES # BLD AUTO: 0.71 X10(3) UL (ref 0.1–1)
MONOCYTES NFR BLD AUTO: 10.8 %
NEUTROPHILS # BLD AUTO: 2.71 X10 (3) UL (ref 1.5–7.7)
NEUTROPHILS # BLD AUTO: 2.71 X10(3) UL (ref 1.5–7.7)
NEUTROPHILS NFR BLD AUTO: 41.3 %
OSMOLALITY SERPL CALC.SUM OF ELEC: 294 MOSM/KG (ref 275–295)
P AXIS: 38 DEGREES
P AXIS: 53 DEGREES
P-R INTERVAL: 150 MS
P-R INTERVAL: 162 MS
PLATELET # BLD AUTO: 216 10(3)UL (ref 150–450)
POTASSIUM SERPL-SCNC: 3.9 MMOL/L (ref 3.5–5.1)
Q-T INTERVAL: 324 MS
Q-T INTERVAL: 388 MS
QRS DURATION: 82 MS
QRS DURATION: 82 MS
QTC CALCULATION (BEZET): 398 MS
QTC CALCULATION (BEZET): 415 MS
R AXIS: 36 DEGREES
R AXIS: 42 DEGREES
RBC # BLD AUTO: 4.48 X10(6)UL (ref 4.3–5.7)
SODIUM SERPL-SCNC: 141 MMOL/L (ref 136–145)
T AXIS: 21 DEGREES
T AXIS: 25 DEGREES
TROPONIN I SERPL-MCNC: <0.045 NG/ML (ref ?–0.04)
TROPONIN I SERPL-MCNC: <0.045 NG/ML (ref ?–0.04)
VENTRICULAR RATE: 69 BPM
VENTRICULAR RATE: 91 BPM
WBC # BLD AUTO: 6.6 X10(3) UL (ref 4–11)

## 2020-07-08 PROCEDURE — 85025 COMPLETE CBC W/AUTO DIFF WBC: CPT | Performed by: EMERGENCY MEDICINE

## 2020-07-08 PROCEDURE — 71045 X-RAY EXAM CHEST 1 VIEW: CPT | Performed by: EMERGENCY MEDICINE

## 2020-07-08 PROCEDURE — 93010 ELECTROCARDIOGRAM REPORT: CPT

## 2020-07-08 PROCEDURE — 85025 COMPLETE CBC W/AUTO DIFF WBC: CPT

## 2020-07-08 PROCEDURE — 99285 EMERGENCY DEPT VISIT HI MDM: CPT

## 2020-07-08 PROCEDURE — 80053 COMPREHEN METABOLIC PANEL: CPT

## 2020-07-08 PROCEDURE — 84484 ASSAY OF TROPONIN QUANT: CPT

## 2020-07-08 PROCEDURE — 84484 ASSAY OF TROPONIN QUANT: CPT | Performed by: EMERGENCY MEDICINE

## 2020-07-08 PROCEDURE — 99284 EMERGENCY DEPT VISIT MOD MDM: CPT

## 2020-07-08 PROCEDURE — 36415 COLL VENOUS BLD VENIPUNCTURE: CPT

## 2020-07-08 PROCEDURE — 80053 COMPREHEN METABOLIC PANEL: CPT | Performed by: EMERGENCY MEDICINE

## 2020-07-08 PROCEDURE — 93005 ELECTROCARDIOGRAM TRACING: CPT

## 2020-07-08 NOTE — ED INITIAL ASSESSMENT (HPI)
Pt reports previous ablation, tonight with left hand tingling, and throbbing feeling to upper chest.

## 2020-07-08 NOTE — ED PROVIDER NOTES
Patient Seen in: [facility] Emergency Department      History   Patient presents with:  Chest Pain Angina    Stated Complaint: cp    HPI    40-year-old male past medical history of SVT status post ablation now presents ED with complaints of chest pa well-developed. HENT:      Head: Normocephalic and atraumatic. Eyes:      Pupils: Pupils are equal, round, and reactive to light. Neck:      Musculoskeletal: Normal range of motion and neck supple.    Cardiovascular:      Rate and Rhythm: Normal rate 2020 at 4:19 AM    Rate, intervals and axes as noted on EKG Report.   Rate: 69  Rhythm: Sinus Rhythm  Reading: No gross ST elevations or depressions see unchanged from previous              Chest x-ray: No mediastinal widening, effusions, or infiltrates

## 2020-07-14 ENCOUNTER — PATIENT OUTREACH (OUTPATIENT)
Dept: FAMILY MEDICINE CLINIC | Facility: CLINIC | Age: 45
End: 2020-07-14

## 2020-12-26 ENCOUNTER — TELEPHONE (OUTPATIENT)
Dept: FAMILY MEDICINE CLINIC | Facility: CLINIC | Age: 45
End: 2020-12-26

## 2020-12-26 NOTE — TELEPHONE ENCOUNTER
Many ED 12/24/20  Call to schedule ER follow-up for chest pain. Thank you-needs to be in office.   Not virtual

## 2021-04-07 ENCOUNTER — TELEPHONE (OUTPATIENT)
Dept: FAMILY MEDICINE CLINIC | Facility: CLINIC | Age: 46
End: 2021-04-07

## 2021-04-07 NOTE — TELEPHONE ENCOUNTER
Received signed medical records request/authorization form for [facility] to disclose patient health information to the Facility identified below:     Facility / Provider Name: [facility] (for [employer])     Facility Address:  [address]

## 2021-07-03 PROCEDURE — 93010 ELECTROCARDIOGRAM REPORT: CPT | Performed by: INTERNAL MEDICINE

## 2021-07-06 ENCOUNTER — HOSPITAL ENCOUNTER (EMERGENCY)
Facility: HOSPITAL | Age: 46
Discharge: HOME | End: 2021-07-06
Attending: EMERGENCY MEDICINE
Payer: COMMERCIAL

## 2021-07-06 ENCOUNTER — APPOINTMENT (OUTPATIENT)
Dept: CV DIAGNOSTICS | Facility: HOSPITAL | Age: 46
End: 2021-07-06
Attending: EMERGENCY MEDICINE
Payer: COMMERCIAL

## 2021-07-06 ENCOUNTER — APPOINTMENT (OUTPATIENT)
Dept: GENERAL RADIOLOGY | Facility: HOSPITAL | Age: 46
End: 2021-07-06
Attending: EMERGENCY MEDICINE
Payer: COMMERCIAL

## 2021-07-06 VITALS
OXYGEN SATURATION: 98 % | BODY MASS INDEX: 40.18 KG/M2 | DIASTOLIC BLOOD PRESSURE: 90 MMHG | SYSTOLIC BLOOD PRESSURE: 128 MMHG | WEIGHT: 250 LBS | HEIGHT: 66 IN | RESPIRATION RATE: 22 BRPM | HEART RATE: 62 BPM | TEMPERATURE: 98 F

## 2021-07-06 DIAGNOSIS — R00.2 PALPITATIONS: Primary | ICD-10-CM

## 2021-07-06 DIAGNOSIS — R09.1 PLEURISY: ICD-10-CM

## 2021-07-06 LAB
ALBUMIN SERPL-MCNC: 3.6 G/DL (ref 3.4–5)
ALBUMIN/GLOB SERPL: 0.8 {RATIO} (ref 1–2)
ALP LIVER SERPL-CCNC: 52 U/L
ALT SERPL-CCNC: 31 U/L
ANION GAP SERPL CALC-SCNC: 4 MMOL/L (ref 0–18)
AST SERPL-CCNC: 21 U/L (ref 15–37)
BASOPHILS # BLD AUTO: 0.02 X10(3) UL (ref 0–0.2)
BASOPHILS NFR BLD AUTO: 0.5 %
BILIRUB SERPL-MCNC: 0.3 MG/DL (ref 0.1–2)
BUN BLD-MCNC: 11 MG/DL (ref 7–18)
BUN/CREAT SERPL: 9.8 (ref 10–20)
CALCIUM BLD-MCNC: 8.9 MG/DL (ref 8.5–10.1)
CHLORIDE SERPL-SCNC: 106 MMOL/L (ref 98–112)
CO2 SERPL-SCNC: 26 MMOL/L (ref 21–32)
CREAT BLD-MCNC: 1.12 MG/DL
D-DIMER: 0.38 UG/ML FEU (ref ?–0.5)
DEPRECATED RDW RBC AUTO: 41.2 FL (ref 35.1–46.3)
EOSINOPHIL # BLD AUTO: 0.16 X10(3) UL (ref 0–0.7)
EOSINOPHIL NFR BLD AUTO: 3.9 %
ERYTHROCYTE [DISTWIDTH] IN BLOOD BY AUTOMATED COUNT: 13 % (ref 11–15)
GLOBULIN PLAS-MCNC: 4.7 G/DL (ref 2.8–4.4)
GLUCOSE BLD-MCNC: 107 MG/DL (ref 70–99)
HCT VFR BLD AUTO: 40.3 %
HGB BLD-MCNC: 13.4 G/DL
IMM GRANULOCYTES # BLD AUTO: 0 X10(3) UL (ref 0–1)
IMM GRANULOCYTES NFR BLD: 0 %
LYMPHOCYTES # BLD AUTO: 2.23 X10(3) UL (ref 1–4)
LYMPHOCYTES NFR BLD AUTO: 54.5 %
M PROTEIN MFR SERPL ELPH: 8.3 G/DL (ref 6.4–8.2)
MCH RBC QN AUTO: 29 PG (ref 26–34)
MCHC RBC AUTO-ENTMCNC: 33.3 G/DL (ref 31–37)
MCV RBC AUTO: 87.2 FL
MONOCYTES # BLD AUTO: 0.52 X10(3) UL (ref 0.1–1)
MONOCYTES NFR BLD AUTO: 12.7 %
NEUTROPHILS # BLD AUTO: 1.16 X10 (3) UL (ref 1.5–7.7)
NEUTROPHILS # BLD AUTO: 1.16 X10(3) UL (ref 1.5–7.7)
NEUTROPHILS NFR BLD AUTO: 28.4 %
NT-PROBNP SERPL-MCNC: <5 PG/ML (ref ?–125)
OSMOLALITY SERPL CALC.SUM OF ELEC: 282 MOSM/KG (ref 275–295)
PLATELET # BLD AUTO: 251 10(3)UL (ref 150–450)
POTASSIUM SERPL-SCNC: 4.2 MMOL/L (ref 3.5–5.1)
RBC # BLD AUTO: 4.62 X10(6)UL
SARS-COV-2 RNA RESP QL NAA+PROBE: NOT DETECTED
SODIUM SERPL-SCNC: 136 MMOL/L (ref 136–145)
TROPONIN I SERPL-MCNC: <0.045 NG/ML (ref ?–0.04)
WBC # BLD AUTO: 4.1 X10(3) UL (ref 4–11)

## 2021-07-06 PROCEDURE — 93005 ELECTROCARDIOGRAM TRACING: CPT

## 2021-07-06 PROCEDURE — 99285 EMERGENCY DEPT VISIT HI MDM: CPT

## 2021-07-06 PROCEDURE — 85379 FIBRIN DEGRADATION QUANT: CPT | Performed by: EMERGENCY MEDICINE

## 2021-07-06 PROCEDURE — 84484 ASSAY OF TROPONIN QUANT: CPT | Performed by: EMERGENCY MEDICINE

## 2021-07-06 PROCEDURE — 71045 X-RAY EXAM CHEST 1 VIEW: CPT | Performed by: EMERGENCY MEDICINE

## 2021-07-06 PROCEDURE — 93018 CV STRESS TEST I&R ONLY: CPT | Performed by: EMERGENCY MEDICINE

## 2021-07-06 PROCEDURE — 83880 ASSAY OF NATRIURETIC PEPTIDE: CPT | Performed by: EMERGENCY MEDICINE

## 2021-07-06 PROCEDURE — 80053 COMPREHEN METABOLIC PANEL: CPT | Performed by: EMERGENCY MEDICINE

## 2021-07-06 PROCEDURE — 93350 STRESS TTE ONLY: CPT | Performed by: EMERGENCY MEDICINE

## 2021-07-06 PROCEDURE — 93017 CV STRESS TEST TRACING ONLY: CPT | Performed by: EMERGENCY MEDICINE

## 2021-07-06 PROCEDURE — 85025 COMPLETE CBC W/AUTO DIFF WBC: CPT | Performed by: EMERGENCY MEDICINE

## 2021-07-06 PROCEDURE — 36415 COLL VENOUS BLD VENIPUNCTURE: CPT

## 2021-07-06 PROCEDURE — 93010 ELECTROCARDIOGRAM REPORT: CPT

## 2021-07-06 RX ORDER — AZITHROMYCIN 250 MG/1
250 TABLET, FILM COATED ORAL DAILY
COMMUNITY
End: 2021-08-25 | Stop reason: ALTCHOICE

## 2021-07-06 RX ORDER — AMOXICILLIN AND CLAVULANATE POTASSIUM 500; 125 MG/1; MG/1
1 TABLET, FILM COATED ORAL 2 TIMES DAILY
COMMUNITY
End: 2021-08-25 | Stop reason: ALTCHOICE

## 2021-07-06 NOTE — ED PROVIDER NOTES
Patient Seen in: [facility] Emergency Department      History   Patient presents with:  Arrythmia/Palpitations    Stated Complaint: arrhythmia     HPI/Subjective:   HPI    68-year-old male here with chest discomfort he has some mild chest discomfort oriented ×3 in no acute distress. HEENT exam within normal limits. Neck supple without lymphadenopathy or JVD. Lungs are clear to auscultation. Cardiovascular exam regular rate and rhythm without murmurs.   Abdomen is soft and nontender without rebound discharge with some follow-up with his primary care physician or cardiology of next 2 days return any problems.                              Disposition and Plan     Clinical Impression:  Palpitations  (primary encounter diagnosis)  Pleurisy     Disposition

## 2021-07-06 NOTE — ED INITIAL ASSESSMENT (HPI)
Pt c/o intermittent palpitations since this AM. Pt states he was seen in the ER Friday and dx with pneumonia. Pt states he has a hx of SVT but this doesn't feel like that. Pt states having chest pressure.

## 2021-07-07 ENCOUNTER — OFFICE VISIT (OUTPATIENT)
Dept: FAMILY MEDICINE CLINIC | Facility: CLINIC | Age: 46
End: 2021-07-07
Payer: COMMERCIAL

## 2021-07-07 VITALS
DIASTOLIC BLOOD PRESSURE: 88 MMHG | HEART RATE: 81 BPM | OXYGEN SATURATION: 99 % | BODY MASS INDEX: 39.73 KG/M2 | HEIGHT: 66 IN | TEMPERATURE: 97 F | WEIGHT: 247.19 LBS | RESPIRATION RATE: 16 BRPM | SYSTOLIC BLOOD PRESSURE: 136 MMHG

## 2021-07-07 DIAGNOSIS — Z87.01 HISTORY OF PNEUMONIA: Primary | ICD-10-CM

## 2021-07-07 DIAGNOSIS — R00.2 PALPITATIONS: ICD-10-CM

## 2021-07-07 PROBLEM — J18.9 COMMUNITY ACQUIRED PNEUMONIA: Status: ACTIVE | Noted: 2021-07-07

## 2021-07-07 PROBLEM — R94.39 ABNORMAL NUCLEAR STRESS TEST: Status: ACTIVE | Noted: 2021-07-07

## 2021-07-07 PROBLEM — R94.31 ACUTE ELECTROCARDIOGRAM CHANGES: Status: ACTIVE | Noted: 2021-07-07

## 2021-07-07 PROBLEM — R10.13 DYSPEPSIA: Status: ACTIVE | Noted: 2021-07-07

## 2021-07-07 LAB
ATRIAL RATE: 70 BPM
P AXIS: 52 DEGREES
P-R INTERVAL: 162 MS
Q-T INTERVAL: 386 MS
QRS DURATION: 84 MS
QTC CALCULATION (BEZET): 416 MS
R AXIS: 39 DEGREES
T AXIS: 21 DEGREES
VENTRICULAR RATE: 70 BPM

## 2021-07-07 PROCEDURE — 99213 OFFICE O/P EST LOW 20 MIN: CPT | Performed by: NURSE PRACTITIONER

## 2021-07-07 PROCEDURE — 3008F BODY MASS INDEX DOCD: CPT | Performed by: NURSE PRACTITIONER

## 2021-07-07 PROCEDURE — 3079F DIAST BP 80-89 MM HG: CPT | Performed by: NURSE PRACTITIONER

## 2021-07-07 PROCEDURE — 3075F SYST BP GE 130 - 139MM HG: CPT | Performed by: NURSE PRACTITIONER

## 2021-07-07 RX ORDER — OMEPRAZOLE 40 MG/1
CAPSULE, DELAYED RELEASE ORAL
COMMUNITY
End: 2021-08-25 | Stop reason: ALTCHOICE

## 2021-07-07 RX ORDER — PSEUDOEPHED/ACETAMINOPH/DIPHEN 30MG-500MG
TABLET ORAL
COMMUNITY
Start: 2021-07-03 | End: 2021-07-07 | Stop reason: ALTCHOICE

## 2021-07-07 NOTE — PROGRESS NOTES
HPI/Subjective:   Patient ID: Jasiel Henriquez is a 50year old male. Patient is seen in office today for ER follow-up. Was seen at [facility] in [place] on 7/2/2021 with complaints of headache, fever of 101.4 and a cough. Covid test was negative. 8 (eight) hours. • Amoxicillin-Pot Clavulanate 500-125 MG Oral Tab Take 1 tablet by mouth 2 (two) times a day. • aspirin 81 MG Oral Chew Tab Chew 81 mg by mouth.      • Omeprazole 40 MG Oral Capsule Delayed Release Every Day (Patient not taking: Rep Has a few more days of antibiotic to complete. Will order repeat chest x-ray to make sure disease has completely resolved. He denies any chest congestion or cough.   Reports he would like to go back to work, is requesting a letter stating he is cleared to

## 2021-07-07 NOTE — PATIENT INSTRUCTIONS
Continue antibiotics as prescribed. Monitor symptoms. May return to work 7/11/2021, as long as no fever or cough. Letter printed.    You are due for labs and physical. Have them done before physical.   ER precautions for chest pain, shortness or breath,

## 2021-07-12 ENCOUNTER — TELEPHONE (OUTPATIENT)
Dept: FAMILY MEDICINE CLINIC | Facility: CLINIC | Age: 46
End: 2021-07-12

## 2021-07-12 NOTE — TELEPHONE ENCOUNTER
----- Message from Glo Ram MD sent at 7/12/2021  7:59 AM CDT -----  Results reviewed. Released to [facility]. Tests show no significant abnormalities.

## 2021-07-12 NOTE — TELEPHONE ENCOUNTER
Echo Stress Test Results    LMOM to return call to the office.  Provided pt office phone [phone]

## 2021-07-12 NOTE — TELEPHONE ENCOUNTER
Patient notified of normal results. Encouraged to keep follow-up 7/28/21 with Dr. Royal Price to discuss blood pressure. Patient verbalized understanding, would like to discuss restarting metoprolol, if needed.

## 2021-07-24 LAB
ABSOLUTE BASOPHILS: 29 CELLS/UL (ref 0–200)
ABSOLUTE EOSINOPHILS: 88 CELLS/UL (ref 15–500)
ABSOLUTE LYMPHOCYTES: 2797 CELLS/UL (ref 850–3900)
ABSOLUTE MONOCYTES: 332 CELLS/UL (ref 200–950)
ABSOLUTE NEUTROPHILS: 953 CELLS/UL (ref 1500–7800)
ALBUMIN/GLOBULIN RATIO: 1.2 (CALC) (ref 1–2.5)
ALBUMIN: 4.3 G/DL (ref 3.6–5.1)
ALKALINE PHOSPHATASE: 45 U/L (ref 36–130)
ALT: 26 U/L (ref 9–46)
AST: 22 U/L (ref 10–40)
BASOPHILS: 0.7 %
BILIRUBIN, TOTAL: 0.5 MG/DL (ref 0.2–1.2)
BUN: 11 MG/DL (ref 7–25)
CALCIUM: 9.3 MG/DL (ref 8.6–10.3)
CARBON DIOXIDE: 27 MMOL/L (ref 20–32)
CHLORIDE: 106 MMOL/L (ref 98–110)
CREATININE: 1.02 MG/DL (ref 0.6–1.35)
EGFR IF AFRICN AM: 101 ML/MIN/1.73M2
EGFR IF NONAFRICN AM: 87 ML/MIN/1.73M2
EOSINOPHILS: 2.1 %
GLOBULIN: 3.6 G/DL (CALC) (ref 1.9–3.7)
GLUCOSE: 81 MG/DL (ref 65–99)
HEMATOCRIT: 42 % (ref 38.5–50)
HEMOGLOBIN A1C: 5.7 % OF TOTAL HGB
HEMOGLOBIN: 14 G/DL (ref 13.2–17.1)
LYMPHOCYTES: 66.6 %
MCH: 28.4 PG (ref 27–33)
MCHC: 33.3 G/DL (ref 32–36)
MCV: 85.2 FL (ref 80–100)
MONOCYTES: 7.9 %
MPV: 12.1 FL (ref 7.5–12.5)
NEUTROPHILS: 22.7 %
PLATELET COUNT: 221 THOUSAND/UL (ref 140–400)
POTASSIUM: 4.6 MMOL/L (ref 3.5–5.3)
PROTEIN, TOTAL: 7.9 G/DL (ref 6.1–8.1)
PSA, TOTAL: 0.3 NG/ML
RDW: 13.2 % (ref 11–15)
RED BLOOD CELL COUNT: 4.93 MILLION/UL (ref 4.2–5.8)
SODIUM: 140 MMOL/L (ref 135–146)
TSH W/REFLEX TO FT4: 1.24 MIU/L (ref 0.4–4.5)
WHITE BLOOD CELL COUNT: 4.2 THOUSAND/UL (ref 3.8–10.8)

## 2021-07-28 ENCOUNTER — OFFICE VISIT (OUTPATIENT)
Dept: FAMILY MEDICINE CLINIC | Facility: CLINIC | Age: 46
End: 2021-07-28
Payer: COMMERCIAL

## 2021-07-28 VITALS
WEIGHT: 250.38 LBS | RESPIRATION RATE: 16 BRPM | OXYGEN SATURATION: 96 % | BODY MASS INDEX: 40.24 KG/M2 | HEIGHT: 66 IN | HEART RATE: 77 BPM | DIASTOLIC BLOOD PRESSURE: 86 MMHG | TEMPERATURE: 98 F | SYSTOLIC BLOOD PRESSURE: 130 MMHG

## 2021-07-28 DIAGNOSIS — G47.33 SEVERE OBSTRUCTIVE SLEEP APNEA: ICD-10-CM

## 2021-07-28 DIAGNOSIS — R73.03 PREDIABETES: Primary | ICD-10-CM

## 2021-07-28 DIAGNOSIS — Z80.0 FAMILY HISTORY OF COLON CANCER IN FATHER: ICD-10-CM

## 2021-07-28 DIAGNOSIS — Z23 NEED FOR VACCINATION: ICD-10-CM

## 2021-07-28 DIAGNOSIS — Z87.01 HISTORY OF PNEUMONIA: ICD-10-CM

## 2021-07-28 DIAGNOSIS — Z12.11 SCREEN FOR COLON CANCER: ICD-10-CM

## 2021-07-28 DIAGNOSIS — E78.5 DYSLIPIDEMIA: ICD-10-CM

## 2021-07-28 DIAGNOSIS — I47.1 SVT (SUPRAVENTRICULAR TACHYCARDIA) (HCC): ICD-10-CM

## 2021-07-28 DIAGNOSIS — I10 ESSENTIAL HYPERTENSION: Chronic | ICD-10-CM

## 2021-07-28 PROCEDURE — 3008F BODY MASS INDEX DOCD: CPT | Performed by: FAMILY MEDICINE

## 2021-07-28 PROCEDURE — 90471 IMMUNIZATION ADMIN: CPT | Performed by: FAMILY MEDICINE

## 2021-07-28 PROCEDURE — 99214 OFFICE O/P EST MOD 30 MIN: CPT | Performed by: FAMILY MEDICINE

## 2021-07-28 PROCEDURE — 3075F SYST BP GE 130 - 139MM HG: CPT | Performed by: FAMILY MEDICINE

## 2021-07-28 PROCEDURE — 3079F DIAST BP 80-89 MM HG: CPT | Performed by: FAMILY MEDICINE

## 2021-07-28 PROCEDURE — 90732 PPSV23 VACC 2 YRS+ SUBQ/IM: CPT | Performed by: FAMILY MEDICINE

## 2021-07-28 RX ORDER — ORAL SEMAGLUTIDE 3 MG/1
3 TABLET ORAL DAILY
Qty: 30 TABLET | Refills: 0 | Status: SHIPPED | OUTPATIENT
Start: 2021-07-28 | End: 2021-08-25

## 2021-07-28 RX ORDER — METOPROLOL SUCCINATE 50 MG/1
50 TABLET, EXTENDED RELEASE ORAL DAILY
Qty: 90 TABLET | Refills: 1 | Status: SHIPPED | OUTPATIENT
Start: 2021-07-28 | End: 2021-09-20

## 2021-07-28 NOTE — PROGRESS NOTES
CHIEF COMPLAINT: No chief complaint on file. HPI:     Dennie Goring is a 50year old male presents for discuss labs. Recent labs ordered by APRN in our office for follow-up ER here to discuss.     History of hypertension/SVT with history of cardia Surgical History:   Procedure Laterality Date   • UPPER GI EGD, HISTOLOGY - JAR(S): 6  11/2017    Dr Mary Carter- H pylori positive bx. severe gastritis      Family History   Problem Relation Age of Onset   • Hypertension Father    • Cancer Father         Cance RASH    PSFH elements reviewed from today and agreed except as otherwise stated in HPI.   PHYSICAL EXAM:   /86 (BP Location: Right arm, Patient Position: Sitting, Cuff Size: large)   Pulse 77   Temp 98 °F (36.7 °C) (Oral)   Resp 16   Ht 5' 6\" (1.6 07/23/2021 140    • POTASSIUM 07/23/2021 4.6    • CHLORIDE 07/23/2021 106    • CARBON DIOXIDE 07/23/2021 27    • CALCIUM 07/23/2021 9.3    • PROTEIN, TOTAL 07/23/2021 7.9    • ALBUMIN 07/23/2021 4.3    • GLOBULIN 07/23/2021 3.6    • ALBUMIN/GLOBULIN RATIO 07/06/2021 33.3    • RDW 07/06/2021 13.0    • RDW-SD 07/06/2021 41.2    • Neutrophil Absolute Prel* 07/06/2021 1.16*   • Neutrophil Absolute 07/06/2021 1.16*   • Lymphocyte Absolute 07/06/2021 2.23    • Monocyte Absolute 07/06/2021 0.52    • Eosinophil Abs not improved    4. SVT (supraventricular tachycardia) (HCC)  - CARDIO - INTERNAL  Restart metoprolol 50 mg daily may have prevented ER visit for palpitations  Compliance with CPAP-patient using regularly  Needs annual cardiology follow-up    5.  History of pain     Hypertension     GERD without esophagitis     SVT (supraventricular tachycardia) (HCC)     Obesity (BMI 35.0-39.9 without comorbidity)     Severe obstructive sleep apnea     Dyslipidemia     Class 2 severe obesity due to excess calories with ilya

## 2021-08-15 LAB
CHOL/HDLC RATIO: 5.6 (CALC)
CHOLESTEROL, TOTAL: 190 MG/DL
HDL CHOLESTEROL: 34 MG/DL
LDL-CHOLESTEROL: 132 MG/DL (CALC)
NON-HDL CHOLESTEROL: 156 MG/DL (CALC)
TRIGLYCERIDES: 127 MG/DL

## 2021-08-16 NOTE — PROGRESS NOTES
Andrei notified:  See my chart message to patient. The patient's ASCVD 10 year risk score is 8.6. Dear Jax Bolanos,  Your labs are normal except your lipid panel is elevated. 10-year risk of heart attack per ASCVD score is 8.6, which is high.   George

## 2021-08-25 ENCOUNTER — OFFICE VISIT (OUTPATIENT)
Dept: FAMILY MEDICINE CLINIC | Facility: CLINIC | Age: 46
End: 2021-08-25
Payer: COMMERCIAL

## 2021-08-25 VITALS
DIASTOLIC BLOOD PRESSURE: 80 MMHG | RESPIRATION RATE: 18 BRPM | HEIGHT: 66 IN | BODY MASS INDEX: 39.08 KG/M2 | HEART RATE: 78 BPM | OXYGEN SATURATION: 98 % | TEMPERATURE: 97 F | WEIGHT: 243.19 LBS | SYSTOLIC BLOOD PRESSURE: 120 MMHG

## 2021-08-25 DIAGNOSIS — R73.03 PREDIABETES: Primary | ICD-10-CM

## 2021-08-25 DIAGNOSIS — Z12.11 SCREEN FOR COLON CANCER: ICD-10-CM

## 2021-08-25 DIAGNOSIS — Z80.3 FAMILY HISTORY OF BREAST CANCER IN FIRST DEGREE RELATIVE: ICD-10-CM

## 2021-08-25 DIAGNOSIS — E78.5 DYSLIPIDEMIA: ICD-10-CM

## 2021-08-25 DIAGNOSIS — Z80.0 FAMILY HISTORY OF COLON CANCER IN FATHER: ICD-10-CM

## 2021-08-25 PROBLEM — R73.01 ELEVATED FASTING BLOOD SUGAR: Status: RESOLVED | Noted: 2019-10-31 | Resolved: 2021-08-25

## 2021-08-25 PROCEDURE — 99214 OFFICE O/P EST MOD 30 MIN: CPT | Performed by: FAMILY MEDICINE

## 2021-08-25 PROCEDURE — 3074F SYST BP LT 130 MM HG: CPT | Performed by: FAMILY MEDICINE

## 2021-08-25 PROCEDURE — 3008F BODY MASS INDEX DOCD: CPT | Performed by: FAMILY MEDICINE

## 2021-08-25 PROCEDURE — 3079F DIAST BP 80-89 MM HG: CPT | Performed by: FAMILY MEDICINE

## 2021-08-25 RX ORDER — ORAL SEMAGLUTIDE 7 MG/1
7 TABLET ORAL DAILY
Qty: 30 TABLET | Refills: 1 | Status: SHIPPED | OUTPATIENT
Start: 2021-08-25 | End: 2021-09-20

## 2021-08-25 RX ORDER — ATORVASTATIN CALCIUM 20 MG/1
20 TABLET, FILM COATED ORAL NIGHTLY
Qty: 90 TABLET | Refills: 0 | Status: SHIPPED | OUTPATIENT
Start: 2021-08-25 | End: 2021-10-06

## 2021-08-25 NOTE — PATIENT INSTRUCTIONS
[facility]. ORG TO SCHEDULE COVID 19 VACCINE      CLICK ON SCHEDULE COVID 19 VACCINE ON TOP BANNER OF WEBPAGE. If you have received vaccinations recently ,must wait 2 weeks before receiving first COVID-19 vaccination.

## 2021-08-25 NOTE — PROGRESS NOTES
CHIEF COMPLAINT: Patient presents with:  Medication Follow-Up  Cholesterol: follow up     HPI:     Gladys Linder is a 50year old male presents for discuss cholesterol labs and prediabetes not Rybelsus. Denies medication side effects Rybelsus.   Lost 7 p Sister    • Diabetes Sister    • Breast Cancer Sister    • Heart Disorder Sister    • No Known Problems Daughter    • No Known Problems Son    • Heart Disorder Brother    • No Known Problems Daughter    • No Known Problems Daughter    • No Known Problems B clear  NECK: supple,no adenopathy,no bruits, no JVD  LUNGS: clear to auscultation bilateral. No RRW.  Good inspiratory and expiratory efferot  CARDIO: RRR without murmur, clear S1, S2  VS: no carotid artery or abdominal aorta bruit,    GI: good BS's,no mass 07/23/2021 45    • AST 07/23/2021 22    • ALT 07/23/2021 26    • HEMOGLOBIN A1c 07/23/2021 5.7*   • TSH W/REFLEX TO FT4 07/23/2021 1.24    • PSA, TOTAL 07/23/2021 0.3       REVIEWED THIS VISIT  ASSESSMENT/PLAN:   50year old male with    1. Prediabetes  2. Sig: Take 1 tablet (20 mg total) by mouth nightly.        Health Maintenance:  Colonoscopy Never done  Annual Physical due on 12/13/2019  Influenza Vaccine(1) due on 10/01/2021  Annual Depression Screen due on 07/07/2022  Pneumococcal Vaccine: Birth to

## 2021-08-26 ENCOUNTER — HOSPITAL ENCOUNTER (OUTPATIENT)
Dept: GENERAL RADIOLOGY | Facility: HOSPITAL | Age: 46
Discharge: HOME | End: 2021-08-26
Attending: NURSE PRACTITIONER
Payer: COMMERCIAL

## 2021-08-26 DIAGNOSIS — R00.2 PALPITATIONS: ICD-10-CM

## 2021-08-26 DIAGNOSIS — Z87.01 HISTORY OF PNEUMONIA: ICD-10-CM

## 2021-08-26 PROCEDURE — 71046 X-RAY EXAM CHEST 2 VIEWS: CPT | Performed by: NURSE PRACTITIONER

## 2021-09-20 DIAGNOSIS — I10 ESSENTIAL HYPERTENSION: Chronic | ICD-10-CM

## 2021-09-20 DIAGNOSIS — R73.03 PREDIABETES: ICD-10-CM

## 2021-09-20 RX ORDER — METOPROLOL SUCCINATE 50 MG/1
50 TABLET, EXTENDED RELEASE ORAL DAILY
Qty: 90 TABLET | Refills: 1 | Status: SHIPPED | OUTPATIENT
Start: 2021-09-20

## 2021-09-20 RX ORDER — ORAL SEMAGLUTIDE 7 MG/1
7 TABLET ORAL DAILY
Qty: 30 TABLET | Refills: 1 | Status: SHIPPED | OUTPATIENT
Start: 2021-09-20 | End: 2021-10-06

## 2021-09-20 NOTE — TELEPHONE ENCOUNTER
Passed Protocol: Pt requesting refill of   Requested Prescriptions     Pending Prescriptions Disp Refills   • metoprolol succinate 50 MG Oral Tablet 24 Hr 90 tablet 1     Sig: Take 1 tablet (50 mg total) by mouth daily.    • Semaglutide (RYBELSUS) 7 MG Or

## 2021-10-06 DIAGNOSIS — R73.03 PREDIABETES: ICD-10-CM

## 2021-10-06 DIAGNOSIS — E78.5 DYSLIPIDEMIA: ICD-10-CM

## 2021-10-06 RX ORDER — ORAL SEMAGLUTIDE 7 MG/1
7 TABLET ORAL DAILY
Qty: 30 TABLET | Refills: 1 | Status: SHIPPED | OUTPATIENT
Start: 2021-10-06 | End: 2021-11-01

## 2021-10-06 RX ORDER — OMEPRAZOLE 40 MG/1
1 CAPSULE, DELAYED RELEASE ORAL DAILY
COMMUNITY
End: 2021-10-08

## 2021-10-06 RX ORDER — ATORVASTATIN CALCIUM 20 MG/1
20 TABLET, FILM COATED ORAL NIGHTLY
Qty: 90 TABLET | Refills: 0 | Status: SHIPPED | OUTPATIENT
Start: 2021-10-06

## 2021-10-06 NOTE — TELEPHONE ENCOUNTER
Refill Passed Protocol:     Pt requesting refill of   Requested Prescriptions     Pending Prescriptions Disp Refills   • atorvastatin 20 MG Oral Tab 90 tablet 0     Sig: Take 1 tablet (20 mg total) by mouth nightly.    • Semaglutide (RYBELSUS) 7 MG Oral Tab

## 2021-11-01 ENCOUNTER — OFFICE VISIT (OUTPATIENT)
Dept: FAMILY MEDICINE CLINIC | Facility: CLINIC | Age: 46
End: 2021-11-01
Payer: COMMERCIAL

## 2021-11-01 VITALS
HEIGHT: 65 IN | TEMPERATURE: 97 F | DIASTOLIC BLOOD PRESSURE: 74 MMHG | WEIGHT: 223.38 LBS | OXYGEN SATURATION: 98 % | BODY MASS INDEX: 37.22 KG/M2 | HEART RATE: 83 BPM | SYSTOLIC BLOOD PRESSURE: 120 MMHG

## 2021-11-01 DIAGNOSIS — Z00.00 ANNUAL PHYSICAL EXAM: Primary | ICD-10-CM

## 2021-11-01 DIAGNOSIS — Z23 NEED FOR VACCINATION: ICD-10-CM

## 2021-11-01 DIAGNOSIS — Z12.11 SCREEN FOR COLON CANCER: ICD-10-CM

## 2021-11-01 DIAGNOSIS — Z80.0 FAMILY HISTORY OF COLON CANCER IN FATHER: ICD-10-CM

## 2021-11-01 DIAGNOSIS — E78.5 DYSLIPIDEMIA: ICD-10-CM

## 2021-11-01 DIAGNOSIS — G47.33 SEVERE OBSTRUCTIVE SLEEP APNEA: ICD-10-CM

## 2021-11-01 DIAGNOSIS — I47.1 SVT (SUPRAVENTRICULAR TACHYCARDIA) (HCC): ICD-10-CM

## 2021-11-01 DIAGNOSIS — R73.03 PREDIABETES: ICD-10-CM

## 2021-11-01 PROCEDURE — 3008F BODY MASS INDEX DOCD: CPT | Performed by: FAMILY MEDICINE

## 2021-11-01 PROCEDURE — 99396 PREV VISIT EST AGE 40-64: CPT | Performed by: FAMILY MEDICINE

## 2021-11-01 PROCEDURE — 3078F DIAST BP <80 MM HG: CPT | Performed by: FAMILY MEDICINE

## 2021-11-01 PROCEDURE — 90471 IMMUNIZATION ADMIN: CPT | Performed by: FAMILY MEDICINE

## 2021-11-01 PROCEDURE — 90686 IIV4 VACC NO PRSV 0.5 ML IM: CPT | Performed by: FAMILY MEDICINE

## 2021-11-01 PROCEDURE — 3074F SYST BP LT 130 MM HG: CPT | Performed by: FAMILY MEDICINE

## 2021-11-01 RX ORDER — ORAL SEMAGLUTIDE 14 MG/1
14 TABLET ORAL DAILY
Qty: 90 TABLET | Refills: 0 | Status: SHIPPED | OUTPATIENT
Start: 2021-11-01 | End: 2021-12-01

## 2021-11-01 NOTE — PATIENT INSTRUCTIONS
Perform labs fasting 8 hours with water or black coffee or or black tea diet  soda only prior to exam.    -Encourage healthy diet of whole food and avoid processed food and sugary drinks and sodas.   Diet should include lean meats and vegetables including 5 pressure All men in this age group Yearly checkup if your blood pressure reading is normal  Normal blood pressure is less than 120/80 mm Hg  If your blood pressure is higher than normal, follow the advice of your healthcare provider      Depression All men given at least 2 months after the second dose and at least 4 months after the first dose   Haemophilus influenzae Type B (HIB) Men at increased risk for infection – talk with your healthcare provider 1 to 3 doses   Influenza (flu) All men in this age group specific diet to lose weight. It includes a lot of foods from plants such as vegetables, fruits, and whole grains, plus olive oil and seafood. It also includes dairy foods and meats, but in smaller amounts. And it includes a moderate amount of wine.  Aarti field shrimp, oysters, mussels, and clams. Nuts and seeds These include walnuts, almonds, sunflower seeds, cashews, brazil nuts, and pecans. Healthy oil Olive oil is the most common oil in the Mediterranean diet.  But other healthy oils are canola, sunflower, you information on diet changes you may need to make. Your provider may recommend that you see a registered dietitian for help with diet changes.  Changes may include:   · Cutting back on the amount of fat and cholesterol in your meals  · Eating less salt ( will help you lose weight more easily than adding exercise. Quitting smoking  Smoking and other tobacco use can raise cholesterol and make it harder to control. Quitting is tough. But millions of people have given up tobacco for good. You can quit, too! about your medicines. Risk groups  Some people may need to take medicines called statins to control their cholesterol. This is in addition to eating a healthy diet and getting regular exercise.  The dose or intensity of the statin depends on your risk fact 7/1/2019 © 2000-2021 [facility]. All rights reserved. This information is not intended as a substitute for professional medical care. Always follow your healthcare professional's instructions.     Exercise for a Healthier Heart     Exercise work.  Step up your daily activity level. Along with your exercise program, try being more active throughout the day. Walk instead of drive. Do more household tasks or yard work. Choose one or more activities you enjoy.  Walking is one of the easiest thing because they lower good cholesterol as well as raise bad cholesterol. Trans fats are most often found in processed foods. · Reduce sodium (salt) intake. Eating too much salt may increase your blood pressure.  Limit your sodium intake to 2,300 milligrams (m source of a heart-healthy fat. More on heart-healthy eating  Read food labels  Healthy eating starts at the grocery store. Be sure to pay attention to food labels on packaged foods.  Look for products that are high in fiber and protein, and low in saturate This is called peak bone mass. The same good habits that kept bones healthy in childhood help keep bones healthy in adulthood. Age 27 to menopause  Bone mass declines slightly during these years.  Your body makes just enough new bone to maintain peak bone Dairy Fish & beans Other sources   Source   Calcium (mg) per serving   Source   Calcium (mg) per serving   Source   Calcium (mg) per serving   Low-fat yogurt, plain   415 mg/8 oz. Sardines, Atlantic, canned, with bones   351 mg/3 oz.    Oatmeal, inst as sunlight, hits your skin, it creates vitamin D3. D2 is used to fortify dairy foods. Both of these are further processed by your liver and kidneys into a form your body can use.  Most tests for vitamin D check the level of a form circulating in the body c vitamin D in your diet  · Not absorbing vitamin D from your food as you should  Lower levels may also mean that your body is not converting the vitamin as it should. This might be because of kidney or liver disease.   Above-normal levels may be a sign that osteoporosis even if you have no other risk factors. · Smoking. This reduces bone mass. Smoking may also interfere with estrogen levels and cause early menopause. · Inactivity. Not being active makes your bones lose strength and become thinner.  Over time trunk and leg muscles. Exercises that help with flexibility can prevent falls. Examples include swimming, water exercise, and stretching.   Staying safe  Here are tips to stay safe:   · Always check with your healthcare provider before starting any new exer

## 2021-11-01 NOTE — PROGRESS NOTES
Patient presents with: Annual: physical      REASON FOR VISIT:    Donavon Maharaj is a 48year old male who presents for an [address]     Prediabetes-  HEMOGLOBIN A1c (% of total Hgb)   Date Value   07/23/2021 5.7 (H)     HgbA1C (%)   Date Problems Daughter    • No Known Problems Son    • Heart Disorder Brother    • No Known Problems Daughter    • No Known Problems Daughter    • No Known Problems Brother    • Heart Disorder Sister          Wt Readings from Last 6 Encounters:  11/01/21 : 36 Component Value Date    CHOLEST 190 08/14/2021    CHOLEST 188 01/13/2020    CHOLEST 181 10/26/2019     Lab Results   Component Value Date    HDL 34 (L) 08/14/2021    HDL 30 (L) 01/13/2020    HDL 32 (L) 10/26/2019     No results found for: CHRISTOS  Lab Res or pleasure in doing things (over the last two weeks)?: Not at all    Feeling down, depressed, or hopeless (over the last two weeks)?: Not at all    PHQ-2 SCORE: 0        PREVENTATIVE SERVICES  INDICATIONS AND SCHEDULE Recommendation Internal Lab or Proced found for: DIGOXIN No flowsheet data found. Diabetes      HgbA1C  Annually HEMOGLOBIN A1c (% of total Hgb)   Date Value   07/23/2021 5.7 (H)    No flowsheet data found.     Creat/alb ratio  Annually CREATININE (mg/dL)   Date Value   07/23/2021 1.02 Cancer Sister    • Heart Disorder Sister    • No Known Problems Daughter    • No Known Problems Son    • Heart Disorder Brother    • No Known Problems Daughter    • No Known Problems Daughter    • No Known Problems Brother    • Heart Disorder Sister back  EXTREMITIES: no cyanosis, clubbing or edema  NEURO: Oriented times three, cranial nerves are intact, motor and sensory are grossly intact    ASSESSMENT AND OTHER RELEVANT CHRONIC CONDITIONS:   Jasiel Henriquez is a 48year old male who presents for an diet  Continue Rybelsus  Recheck in 3 mos    4. Dyslipidemia  - LIPID PANEL; Future  - COMP METABOLIC PANEL (14);  Future  - LIPID PANEL  - COMP METABOLIC PANEL (14)  Complete labs now and due in 3 mos   mediterranean diet  Exercise start brisk walk 30 mins

## 2021-12-23 ENCOUNTER — TELEPHONE (OUTPATIENT)
Dept: FAMILY MEDICINE CLINIC | Facility: CLINIC | Age: 46
End: 2021-12-23

## 2021-12-23 NOTE — TELEPHONE ENCOUNTER
Pt called office stating he got a call that he was in contact with someone who tested positive for COVID and wants to know what he should do.

## 2021-12-23 NOTE — TELEPHONE ENCOUNTER
Spoke to pt states he was exposed to someone who tested positive yesterday but he does not have any symptoms. Pt is fully vaccinated but does not have a booster yet.    Pt instructed per CDC     someone who has been fully vaccinated and shows no symptoms of

## 2023-02-09 ENCOUNTER — OFFICE VISIT (OUTPATIENT)
Dept: FAMILY MEDICINE CLINIC | Facility: CLINIC | Age: 48
End: 2023-02-09
Payer: COMMERCIAL

## 2023-02-09 VITALS
TEMPERATURE: 97 F | HEART RATE: 66 BPM | HEIGHT: 65.75 IN | DIASTOLIC BLOOD PRESSURE: 80 MMHG | SYSTOLIC BLOOD PRESSURE: 122 MMHG | OXYGEN SATURATION: 97 % | BODY MASS INDEX: 40.96 KG/M2 | RESPIRATION RATE: 16 BRPM | WEIGHT: 251.81 LBS

## 2023-02-09 DIAGNOSIS — Z80.0 FAMILY HISTORY OF COLON CANCER IN FATHER: ICD-10-CM

## 2023-02-09 DIAGNOSIS — Z23 NEED FOR VACCINATION: ICD-10-CM

## 2023-02-09 DIAGNOSIS — Z00.00 ANNUAL PHYSICAL EXAM: Primary | ICD-10-CM

## 2023-02-09 DIAGNOSIS — G47.33 SEVERE OBSTRUCTIVE SLEEP APNEA: ICD-10-CM

## 2023-02-09 DIAGNOSIS — E78.5 DYSLIPIDEMIA: ICD-10-CM

## 2023-02-09 DIAGNOSIS — Z86.79 HISTORY OF HYPERTENSION: ICD-10-CM

## 2023-02-09 DIAGNOSIS — Z13.228 SCREENING FOR ENDOCRINE, NUTRITIONAL, METABOLIC AND IMMUNITY DISORDER: ICD-10-CM

## 2023-02-09 DIAGNOSIS — I47.1 SVT (SUPRAVENTRICULAR TACHYCARDIA) (HCC): ICD-10-CM

## 2023-02-09 DIAGNOSIS — Z13.21 SCREENING FOR ENDOCRINE, NUTRITIONAL, METABOLIC AND IMMUNITY DISORDER: ICD-10-CM

## 2023-02-09 DIAGNOSIS — Z80.3 FAMILY HISTORY OF BREAST CANCER IN FIRST DEGREE RELATIVE: ICD-10-CM

## 2023-02-09 DIAGNOSIS — Z12.5 SCREENING FOR MALIGNANT NEOPLASM OF PROSTATE: ICD-10-CM

## 2023-02-09 DIAGNOSIS — Z12.11 SCREEN FOR COLON CANCER: ICD-10-CM

## 2023-02-09 DIAGNOSIS — R73.03 PREDIABETES: ICD-10-CM

## 2023-02-09 DIAGNOSIS — Z13.0 SCREENING FOR ENDOCRINE, NUTRITIONAL, METABOLIC AND IMMUNITY DISORDER: ICD-10-CM

## 2023-02-09 DIAGNOSIS — Z13.29 SCREENING FOR ENDOCRINE, NUTRITIONAL, METABOLIC AND IMMUNITY DISORDER: ICD-10-CM

## 2023-02-09 PROBLEM — R10.13 DYSPEPSIA: Status: RESOLVED | Noted: 2021-07-07 | Resolved: 2023-02-09

## 2023-02-09 PROBLEM — J18.9 COMMUNITY ACQUIRED PNEUMONIA: Status: RESOLVED | Noted: 2021-07-07 | Resolved: 2023-02-09

## 2023-02-09 PROBLEM — R94.31 ACUTE ELECTROCARDIOGRAM CHANGES: Status: RESOLVED | Noted: 2021-07-07 | Resolved: 2023-02-09

## 2023-02-09 PROBLEM — R94.39 ABNORMAL NUCLEAR STRESS TEST: Status: RESOLVED | Noted: 2021-07-07 | Resolved: 2023-02-09

## 2023-02-09 PROCEDURE — 90471 IMMUNIZATION ADMIN: CPT | Performed by: FAMILY MEDICINE

## 2023-02-09 PROCEDURE — 99396 PREV VISIT EST AGE 40-64: CPT | Performed by: FAMILY MEDICINE

## 2023-02-09 PROCEDURE — 3074F SYST BP LT 130 MM HG: CPT | Performed by: FAMILY MEDICINE

## 2023-02-09 PROCEDURE — 3079F DIAST BP 80-89 MM HG: CPT | Performed by: FAMILY MEDICINE

## 2023-02-09 PROCEDURE — 3008F BODY MASS INDEX DOCD: CPT | Performed by: FAMILY MEDICINE

## 2023-02-09 PROCEDURE — 90677 PCV20 VACCINE IM: CPT | Performed by: FAMILY MEDICINE

## 2023-02-09 RX ORDER — ATORVASTATIN CALCIUM 20 MG/1
20 TABLET, FILM COATED ORAL NIGHTLY
Qty: 90 TABLET | Refills: 0 | Status: SHIPPED | OUTPATIENT
Start: 2023-02-09

## 2023-02-17 ENCOUNTER — TELEPHONE (OUTPATIENT)
Dept: FAMILY MEDICINE CLINIC | Facility: CLINIC | Age: 48
End: 2023-02-17

## 2023-02-17 NOTE — TELEPHONE ENCOUNTER
Pt called office stating his medication for weight management was sent to the pharmacy for 28 days and his ins only coves 21 days for it and per pharmacy they cannot open a package and break the amount for 21 days.

## 2023-02-17 NOTE — TELEPHONE ENCOUNTER
Spoke to pharmacy who said that insurance only covers 21 days of medication, and insurance needs to be contacted to change the prior authorization to 28 days. Called [employer]. They are unable to provide any information on how many days medication is covered for. [address] - who were not able to provide information on prior authorization. Called [employer] again and was told that pt's insurance plan only covers 21 days of medication supply and pt needs to get meds through mail order. Jacob Umaña sent to [employer]. Pt informed.

## 2023-02-21 ENCOUNTER — TELEPHONE (OUTPATIENT)
Dept: FAMILY MEDICINE CLINIC | Facility: CLINIC | Age: 48
End: 2023-02-21

## 2023-02-21 ENCOUNTER — TELEPHONE (OUTPATIENT)
Dept: GENETICS | Age: 48
End: 2023-02-21

## 2023-02-21 NOTE — TELEPHONE ENCOUNTER
Called in regards to his genetics referral.  Pt was told that d/t his insurance he needs a referral for genetic lab. I called [facility] in [facility], but was unable to get the code for genetic lab. Message sent to referral department regarding what referral for genetic lab pt needs.

## 2023-02-21 NOTE — TELEPHONE ENCOUNTER
Patient called to schedule Genetic testing referral for consult is in chart, however referral for genetic lab wasn't in chart. Explained that he needed to contact PCP for genetic lab. Patient Understood and will contact PCP has [facility].

## 2023-02-22 LAB
ABSOLUTE BASOPHILS: 30 CELLS/UL (ref 0–200)
ABSOLUTE EOSINOPHILS: 82 CELLS/UL (ref 15–500)
ABSOLUTE LYMPHOCYTES: 2352 CELLS/UL (ref 850–3900)
ABSOLUTE MONOCYTES: 378 CELLS/UL (ref 200–950)
ABSOLUTE NEUTROPHILS: 1458 CELLS/UL (ref 1500–7800)
ALBUMIN/GLOBULIN RATIO: 1.3 (CALC) (ref 1–2.5)
ALBUMIN: 4.5 G/DL (ref 3.6–5.1)
ALKALINE PHOSPHATASE: 51 U/L (ref 36–130)
ALT: 24 U/L (ref 9–46)
AST: 29 U/L (ref 10–40)
BASOPHILS: 0.7 %
BILIRUBIN, TOTAL: 0.5 MG/DL (ref 0.2–1.2)
BUN: 10 MG/DL (ref 7–25)
CALCIUM: 9.9 MG/DL (ref 8.6–10.3)
CARBON DIOXIDE: 27 MMOL/L (ref 20–32)
CHLORIDE: 102 MMOL/L (ref 98–110)
CHOL/HDLC RATIO: 5.2 (CALC)
CHOLESTEROL, TOTAL: 187 MG/DL
CREATININE: 1.09 MG/DL (ref 0.6–1.29)
EGFR: 83 ML/MIN/1.73M2
EOSINOPHILS: 1.9 %
GLOBULIN: 3.5 G/DL (CALC) (ref 1.9–3.7)
GLUCOSE: 93 MG/DL (ref 65–99)
HDL CHOLESTEROL: 36 MG/DL
HEMATOCRIT: 41.9 % (ref 38.5–50)
HEMOGLOBIN A1C: 5.7 % OF TOTAL HGB
HEMOGLOBIN: 14.1 G/DL (ref 13.2–17.1)
LDL-CHOLESTEROL: 134 MG/DL (CALC)
LYMPHOCYTES: 54.7 %
MCH: 29 PG (ref 27–33)
MCHC: 33.7 G/DL (ref 32–36)
MCV: 86.2 FL (ref 80–100)
MONOCYTES: 8.8 %
MPV: 11.1 FL (ref 7.5–12.5)
NEUTROPHILS: 33.9 %
NON-HDL CHOLESTEROL: 151 MG/DL (CALC)
PLATELET COUNT: 255 THOUSAND/UL (ref 140–400)
POTASSIUM: 4.8 MMOL/L (ref 3.5–5.3)
PROTEIN, TOTAL: 8 G/DL (ref 6.1–8.1)
PSA, TOTAL: 0.25 NG/ML
RDW: 12.6 % (ref 11–15)
RED BLOOD CELL COUNT: 4.86 MILLION/UL (ref 4.2–5.8)
SODIUM: 137 MMOL/L (ref 135–146)
TRIGLYCERIDES: 73 MG/DL
TSH W/REFLEX TO FT4: 1.4 MIU/L (ref 0.4–4.5)
WHITE BLOOD CELL COUNT: 4.3 THOUSAND/UL (ref 3.8–10.8)

## 2023-02-22 NOTE — LETTER
01/15/19        [address] [phone]      Dear Jose A Turk,    Our records indicate that you have outstanding lab work and or testing that was ordered for you and has not yet been completed:  Orders Placed This Encounter 22-Feb-2023 19:24

## 2023-04-03 ENCOUNTER — TELEPHONE (OUTPATIENT)
Dept: FAMILY MEDICINE CLINIC | Facility: CLINIC | Age: 48
End: 2023-04-03

## 2023-04-27 PROBLEM — Z12.11 SPECIAL SCREENING FOR MALIGNANT NEOPLASM OF COLON: Status: ACTIVE | Noted: 2023-04-27

## 2023-04-27 PROBLEM — Z80.0 FAMILY HISTORY OF COLON CANCER: Status: ACTIVE | Noted: 2023-04-27

## 2023-05-04 ENCOUNTER — TELEMEDICINE (OUTPATIENT)
Dept: FAMILY MEDICINE CLINIC | Facility: CLINIC | Age: 48
End: 2023-05-04
Payer: COMMERCIAL

## 2023-05-04 DIAGNOSIS — R73.03 PREDIABETES: ICD-10-CM

## 2023-05-04 DIAGNOSIS — I10 ESSENTIAL HYPERTENSION: Chronic | ICD-10-CM

## 2023-05-04 DIAGNOSIS — E78.5 DYSLIPIDEMIA: ICD-10-CM

## 2023-05-04 DIAGNOSIS — E66.01 CLASS 3 SEVERE OBESITY DUE TO EXCESS CALORIES WITH SERIOUS COMORBIDITY AND BODY MASS INDEX (BMI) OF 40.0 TO 44.9 IN ADULT (HCC): Primary | ICD-10-CM

## 2023-05-04 DIAGNOSIS — L98.9 SKIN LESION: ICD-10-CM

## 2023-05-04 PROBLEM — E66.813 CLASS 3 SEVERE OBESITY DUE TO EXCESS CALORIES WITH SERIOUS COMORBIDITY AND BODY MASS INDEX (BMI) OF 40.0 TO 44.9 IN ADULT (HCC): Status: ACTIVE | Noted: 2019-10-31

## 2023-05-04 PROBLEM — E66.813 CLASS 3 SEVERE OBESITY DUE TO EXCESS CALORIES WITH SERIOUS COMORBIDITY AND BODY MASS INDEX (BMI) OF 40.0 TO 44.9 IN ADULT: Status: ACTIVE | Noted: 2019-10-31

## 2023-05-04 PROCEDURE — 99214 OFFICE O/P EST MOD 30 MIN: CPT | Performed by: FAMILY MEDICINE

## 2023-05-05 RX ORDER — METOPROLOL SUCCINATE 50 MG/1
50 TABLET, EXTENDED RELEASE ORAL DAILY
Qty: 90 TABLET | Refills: 1 | Status: SHIPPED | OUTPATIENT
Start: 2023-05-05

## 2023-05-05 RX ORDER — ATORVASTATIN CALCIUM 20 MG/1
20 TABLET, FILM COATED ORAL NIGHTLY
Qty: 90 TABLET | Refills: 0 | Status: SHIPPED | OUTPATIENT
Start: 2023-05-05

## 2023-05-30 DIAGNOSIS — E78.5 DYSLIPIDEMIA: ICD-10-CM

## 2023-05-30 DIAGNOSIS — I10 ESSENTIAL HYPERTENSION: Chronic | ICD-10-CM

## 2023-05-31 RX ORDER — ATORVASTATIN CALCIUM 20 MG/1
20 TABLET, FILM COATED ORAL NIGHTLY
Qty: 90 TABLET | Refills: 0 | Status: SHIPPED | OUTPATIENT
Start: 2023-05-31

## 2023-05-31 RX ORDER — METOPROLOL SUCCINATE 50 MG/1
50 TABLET, EXTENDED RELEASE ORAL DAILY
Qty: 90 TABLET | Refills: 1 | Status: SHIPPED | OUTPATIENT
Start: 2023-05-31

## 2023-06-08 ENCOUNTER — ORDER TRANSCRIPTION (OUTPATIENT)
Dept: ADMINISTRATIVE | Facility: HOSPITAL | Age: 48
End: 2023-06-08

## 2023-06-08 DIAGNOSIS — Z13.6 SCREENING FOR CARDIOVASCULAR CONDITION: Primary | ICD-10-CM

## 2023-06-12 ENCOUNTER — HOSPITAL ENCOUNTER (OUTPATIENT)
Dept: CT IMAGING | Facility: HOSPITAL | Age: 48
End: 2023-06-12
Attending: FAMILY MEDICINE

## 2023-06-12 DIAGNOSIS — Z13.6 SCREENING FOR CARDIOVASCULAR CONDITION: ICD-10-CM

## 2023-06-13 NOTE — PROGRESS NOTES
Date of Service 6/12/2023    Beau Houston  Date of Birth 11/1/1975    Patient Age: 50year old    PCP: Nadine Robert DO  [address]    Consult Type  Type Scan/Screening: Heart Scan  Preliminary Heart Scan Score: 0          Lipid Profile  Cholesterol: 187, done on 2/21/2023. HDL Cholesterol: 36, done on 2/21/2023. LDL Cholesterol: 134, done on 2/21/2023. TriGlycerides 73, done on 2/21/2023. Nurse Review  Risk factor information and results reviewed with Nurse: Yes    Recommended Follow Up:  Consult your physician regarding[de-identified]   Final Heart Scan Report; Discuss potential for Incidental Finding        Recommendations for Change:  Nutrition Changes: Low Saturated Fat;Low Fat Dairy; Increase Fiber     Cholesterol Modification (goal of therapy depends upon your risk): Increase HDL (Healthy/Good) Normal >45 Men >55 Women;  Decrease LDL (Lousy/Bad) Ideal <100     (Today's NON-FASTING Cholestech Values: Total Cholesterol-140, HDL-31, LDL-86, Triglycerides-114, Glucose-83)    Exercise: Enhance Current Program           Repeat Heart Scan: 5 years if Calcium Score is 0.0     Other[de-identified] Good job improving your Cholesterol values and maintaining weight loss Noman. ...keep up the good work!!!    Marlon Recommended Resources:  Recommended Resources:   [address] www. [email or web address] [facility] [phone]        Jass Norwood RN        Please Contact the Nurse Heart Line with any Questions or Concerns [phone].

## 2023-07-02 DIAGNOSIS — E66.01 CLASS 3 SEVERE OBESITY DUE TO EXCESS CALORIES WITH SERIOUS COMORBIDITY AND BODY MASS INDEX (BMI) OF 40.0 TO 44.9 IN ADULT (HCC): ICD-10-CM

## 2023-09-21 DIAGNOSIS — I10 ESSENTIAL HYPERTENSION: Chronic | ICD-10-CM

## 2023-09-21 DIAGNOSIS — E78.5 DYSLIPIDEMIA: ICD-10-CM

## 2023-09-22 RX ORDER — ATORVASTATIN CALCIUM 20 MG/1
20 TABLET, FILM COATED ORAL NIGHTLY
Qty: 90 TABLET | Refills: 0 | Status: SHIPPED | OUTPATIENT
Start: 2023-09-22

## 2023-09-22 RX ORDER — METOPROLOL SUCCINATE 50 MG/1
50 TABLET, EXTENDED RELEASE ORAL DAILY
Qty: 90 TABLET | Refills: 1 | OUTPATIENT
Start: 2023-09-22

## 2023-09-22 NOTE — TELEPHONE ENCOUNTER
Refill no protocol available:     Pt requesting refill of   Requested Prescriptions     Pending Prescriptions Disp Refills    atorvastatin 20 MG Oral Tab 90 tablet 0     Sig: Take 1 tablet (20 mg total) by mouth nightly. Refused Prescriptions Disp Refills    metoprolol succinate ER 50 MG Oral Tablet 24 Hr 90 tablet 1     Sig: Take 1 tablet (50 mg total) by mouth daily. Sent to Provider for review:  Dyslipidemia  - LIPID PANEL  - COMP METABOLIC PANEL (14)  -Restart atorvastatin 20 MG Oral Tab; Take 1 tablet (20 mg total) by mouth nightly. Dispense: 90 tablet; Refill: 0  Stopped eating fried food and junk food  Family history of stroke in his brother  Repeat labs in 1 month no previous side effects with atorvastatin. Risks and benefits of atorvastatin and statins discussed risk of hepatitis, muscle breakdown/rhabdomyolysis. if joint pain or myalgias,or other side effects, stop medication immediately and call office. Recheck labs in 1 month and will send Oct[employer]art message    History of hypertension  Normotensive  Encouraged weight loss, low-sodium diet, Mediterranean diet  Continue exercising daily  Encouraged home blood pressure monitoring     Last Time Medication was Filled:    Atorvastatin 5/31/2023  Metoprolol 5/31/2023    Last Office Visit with Provider: 2/9/2023    No future appointments. The patient indicates understanding of these issues and agrees to the plan. Return in about 3 months (around 5/9/2023) for medication monitoring- prediabetes.

## 2024-04-23 ENCOUNTER — OFFICE VISIT (OUTPATIENT)
Dept: FAMILY MEDICINE CLINIC | Facility: CLINIC | Age: 49
End: 2024-04-23
Payer: COMMERCIAL

## 2024-04-23 VITALS
HEART RATE: 74 BPM | SYSTOLIC BLOOD PRESSURE: 126 MMHG | HEIGHT: 65 IN | TEMPERATURE: 97 F | OXYGEN SATURATION: 98 % | BODY MASS INDEX: 41.15 KG/M2 | RESPIRATION RATE: 16 BRPM | WEIGHT: 247 LBS | DIASTOLIC BLOOD PRESSURE: 80 MMHG

## 2024-04-23 DIAGNOSIS — Z80.0 FAMILY HISTORY OF COLON CANCER IN FATHER: ICD-10-CM

## 2024-04-23 DIAGNOSIS — K29.00 ACUTE GASTRITIS WITHOUT HEMORRHAGE, UNSPECIFIED GASTRITIS TYPE: ICD-10-CM

## 2024-04-23 DIAGNOSIS — I47.10 SVT (SUPRAVENTRICULAR TACHYCARDIA) (HCC): ICD-10-CM

## 2024-04-23 DIAGNOSIS — Z12.5 SCREENING FOR PROSTATE CANCER: ICD-10-CM

## 2024-04-23 DIAGNOSIS — R73.03 PREDIABETES: ICD-10-CM

## 2024-04-23 DIAGNOSIS — Z86.79 HISTORY OF HYPERTENSION: ICD-10-CM

## 2024-04-23 DIAGNOSIS — Z00.00 ANNUAL PHYSICAL EXAM: Primary | ICD-10-CM

## 2024-04-23 DIAGNOSIS — E66.01 CLASS 3 SEVERE OBESITY DUE TO EXCESS CALORIES WITH SERIOUS COMORBIDITY AND BODY MASS INDEX (BMI) OF 40.0 TO 44.9 IN ADULT (HCC): ICD-10-CM

## 2024-04-23 DIAGNOSIS — E78.5 DYSLIPIDEMIA: ICD-10-CM

## 2024-04-23 DIAGNOSIS — L98.9 SKIN LESION OF FACE: ICD-10-CM

## 2024-04-23 PROCEDURE — 90707 MMR VACCINE SC: CPT | Performed by: FAMILY MEDICINE

## 2024-04-23 PROCEDURE — 90472 IMMUNIZATION ADMIN EACH ADD: CPT | Performed by: FAMILY MEDICINE

## 2024-04-23 PROCEDURE — 90471 IMMUNIZATION ADMIN: CPT | Performed by: FAMILY MEDICINE

## 2024-04-23 PROCEDURE — 90636 HEP A/HEP B VACC ADULT IM: CPT | Performed by: FAMILY MEDICINE

## 2024-04-23 PROCEDURE — 99396 PREV VISIT EST AGE 40-64: CPT | Performed by: FAMILY MEDICINE

## 2024-04-23 RX ORDER — OMEPRAZOLE 40 MG/1
CAPSULE, DELAYED RELEASE ORAL
Qty: 30 CAPSULE | Refills: 1 | Status: SHIPPED | OUTPATIENT
Start: 2024-04-23

## 2024-04-23 RX ORDER — ATORVASTATIN CALCIUM 20 MG/1
20 TABLET, FILM COATED ORAL NIGHTLY
Qty: 90 TABLET | Refills: 1 | Status: SHIPPED | OUTPATIENT
Start: 2024-04-23

## 2024-04-23 RX ORDER — METOPROLOL SUCCINATE 50 MG/1
50 TABLET, EXTENDED RELEASE ORAL DAILY
Qty: 90 TABLET | Refills: 1 | Status: SHIPPED | OUTPATIENT
Start: 2024-04-23

## 2024-04-23 NOTE — PROGRESS NOTES
Chief Complaint   Patient presents with    Physical     Annual physical exam       REASON FOR VISIT:    Noman Reyes is a 48 year old male who presents for an Annual Health Assessment.    Patient's father passed away from metastatic colon cancer a few months ago and patient was traveling to [place] frequently and states he will let his help go.  Stopped all of his medications-ran out.  Cut sodium in his diet and blood pressure has been controlled.  However-patient has history of SVT and has had some palpitations lately.  Denies any chest pain, dizziness or shortness of breath when occurred.    Complains of epigastric pain-nonradiating with eating x 3 weeks.  Admitting to eating spicy food which she has given up and drinking coffee.  States every time he eats he has discomfort in the mid epigastric area.  Can last 1 to 2 hours.  Uncertain what relieves his pain.  Denies any nausea or vomiting.  Pain is not severe.  Stools are brown.  Denies blood in the stool or black stool.  No history of ulcers.  Has not been taking any NSAIDs.  Under stress with losing his father.  Occasional acid reflux, occasional burping.  Denies dysphagia.      Prediabetes-last A1c  HEMOGLOBIN A1c (% of total Hgb)   Date Value   02/21/2023 5.7 (H)   07/23/2021 5.7 (H)     HgbA1C (%)   Date Value   01/13/2020 6.5 (H)   Previously on Rybelsus lost 18 pounds.  Denied any side effects with medication would like to start weight loss medication.  Brother had a stroke in 2023.  Wants to be proactive with his health.  Gave up eating salt.  Blood pressures been controlled.  Denies family history of multiple endocrine neoplasia type II, thyroid cancer or personal history of thyroid cancer or pancreatitis.  Walking an hour 3 miles a day.  Feels \"great\".  Denies polyuria polydipsia.  Denies any side effects with Rybelsus.    Compliant with cpap machine -sleep apnea severe. SVT arythmia mostly at night time. Last visit with  9/2021-using  machine and equipment. Denies fatigue, am headaches.history of SVT not flaring but has had some palpitations.  Previously had palpitations at night when sleep apnea was not controlled.  Is overdue for follow-up with Dr. Subramanian    Pt presents for recheck of hyperlipidemia. Patient reports taking medications as instructed, no medication side effects noted. Denies any generalized muscle aches.  Taking atorvastatin is overdue for repeat labs hepatic function and lipid panel ordered 8/2021    History of HTN- off medications. Avoids sodium and walking 3-4 miles a daily. Wants to lose weight.  Denies headache, chest pain, shortness of breath, palpitations, leg swelling.      , monogamous  On vit D daily -not taking   MVI- no  Calcium- no  Colonoscopy-4/27/2023-recall with 10 years.  PSA 0. 2 5 NG per mL on 2/21/2023, denies nocturia, hematuria, hesitancy, incontinence, ED  Exercise- walking daily 1 hour, Sunday walked 10 miles in one day  Diet -vegetables 1x, salmon, chicken, some fruit, eats out restaurant  Dentist regularly- yes, brush teeth  Annual eye exam- no glasses. yes  Etoh: 6 drinks per week- 1-2 beers at at time.  Cigs: never  Illicits: denies, denies marijuana use  Immunizations: Needs MMR #2-does not recall if ever given, needs hep a and B  FH significant: reviewed, denies family history of prostate cancer  Family History   Problem Relation Age of Onset    Hypertension Father     Cancer Father         Cancer Colon     Colon Cancer Father     Diabetes Mother     Hypertension Mother     Cancer Sister 52        brain, liver    Diabetes Sister     Diabetes Sister     Breast Cancer Sister     Heart Disorder Sister     No Known Problems Daughter     No Known Problems Son     Heart Disorder Brother     No Known Problems Daughter     No Known Problems Daughter     No Known Problems Brother     Heart Disorder Sister     Stroke Brother         7-13 2022         Wt Readings from Last 6 Encounters:   04/23/24 247  lb (112 kg)   04/08/23 240 lb (108.9 kg)   02/09/23 251 lb 12.8 oz (114.2 kg)   11/01/21 223 lb 6.4 oz (101.3 kg)   10/08/21 232 lb (105.2 kg)   08/30/21 243 lb (110.2 kg)         Patient Active Problem List   Diagnosis    Chest pain    Hypertension    GERD without esophagitis    SVT (supraventricular tachycardia) ([facility])    Severe obstructive sleep apnea    Dyslipidemia    Class 3 severe obesity due to excess calories with serious comorbidity and body mass index (BMI) of 40.0 to 44.9 in adult ([facility])    Screen for colon cancer    Family history of colon cancer in father    History of pneumonia    Prediabetes    Family history of breast cancer in first degree relative    Special screening for malignant neoplasm of colon    Family history of colon cancer    Skin lesion     Current Outpatient Medications   Medication Sig Dispense Refill    atorvastatin 20 MG Oral Tab Take 1 tablet (20 mg total) by mouth nightly. (Patient not taking: Reported on 4/23/2024) 90 tablet 0    semaglutide-weight management 0.5 MG/0.5ML Subcutaneous Solution Auto-injector Inject 0.5 mL (0.5 mg total) into the skin once a week. (Patient not taking: Reported on 4/23/2024) 2 mL 1    metoprolol succinate ER 50 MG Oral Tablet 24 Hr Take 1 tablet (50 mg total) by mouth daily. (Patient not taking: Reported on 4/23/2024) 90 tablet 1    PEG 3350-KCl-NaBcb-NaCl-NaSulf (PEG-3350/ELECTROLYTES) 236 g Oral Recon Soln Take as directed by physician (Patient not taking: Reported on 4/23/2024) 4000 mL 0    Acetaminophen 500 MG Oral Cap Take 2 capsules (1,000 mg total) by mouth every 8 (eight) hours. (Patient not taking: Reported on 4/23/2024)      aspirin 81 MG Oral Chew Tab Chew 1 tablet (81 mg total) by mouth. (Patient not taking: Reported on 4/23/2024)       Wt Readings from Last 6 Encounters:   04/23/24 247 lb (112 kg)   04/08/23 240 lb (108.9 kg)   02/09/23 251 lb 12.8 oz (114.2 kg)   11/01/21 223 lb 6.4 oz (101.3 kg)   10/08/21 232 lb (105.2 kg)   08/30/21  243 lb (110.2 kg)     Body mass index is 41.1 kg/m².    No results found for: \"GLUCOSE\"  Lab Results   Component Value Date    CHOLEST 187 02/21/2023    CHOLEST 190 08/14/2021    CHOLEST 188 01/13/2020     Lab Results   Component Value Date    HDL 36 (L) 02/21/2023    HDL 34 (L) 08/14/2021    HDL 30 (L) 01/13/2020     No results found for: \"TRIGLY\"  Lab Results   Component Value Date     (H) 02/21/2023     (H) 08/14/2021     (H) 01/13/2020     Lab Results   Component Value Date    AST 29 02/21/2023    AST 22 07/23/2021    AST 21 07/06/2021     Lab Results   Component Value Date    ALT 24 02/21/2023    ALT 26 07/23/2021    ALT 31 07/06/2021     Lab Results   Component Value Date    TSH 1.070 10/26/2019    TSH 1.290 12/12/2017    TSH 2.970 06/27/2016     Lab Results   Component Value Date    BUN 10 02/21/2023    BUN 11 07/23/2021    BUN 11 07/06/2021    CREATSERUM 1.09 02/21/2023    CREATSERUM 1.02 07/23/2021    CREATSERUM 1.12 07/06/2021     No results found for: \"PSA\"    General Health     How would you describe your current health state?: Fair    Type of Diet: Other    How do you maintain positive mental well-being?: Social Interaction;Puzzles;Games;Visiting Friends;Visiting Family    How would you describe your daily physical activity?: Moderate    If you are a male age 45-79 or a female age 55-79, do you take aspirin?: No    Have you had any immunizations at another office such as Influenza, Hepatitis B, Tetanus, or Pneumococcal?: No    At any time do you feel concerned for the safety/well-being of yourself and/or your children, in your home or elsewhere?: No     CAGE:     Cut: Have you ever felt you should Cut down on your drinking?: No    Annoyed: Have people Annoyed you by criticizing your drinking?: No    Guilty: Have you ever felt bad or Guilty about your drinking?: No    Eye Opener: Have you ever had a drink first thing in the morning to steady your nerves or to get rid of a hangover  (Eye opener)?: No    Scoring  Total Score: 0     Depression Screening (PHQ-2/PHQ-9): Over the LAST 2 WEEKS   Little interest or pleasure in doing things (over the last two weeks)?: Not at all    Feeling down, depressed, or hopeless (over the last two weeks)?: Several days    PHQ-2 SCORE: 1   Lost father to metastatic colon cancer 2-3 months ago.  Grieving.  Does not want medication.  Feels managing and feelings are appropriate.     PREVENTATIVE SERVICES  INDICATIONS AND SCHEDULE Recommendation Internal Lab or Procedure External Lab or Procedure   Colonoscopy Screen Every 10 years Health Maintenance   Topic Date Due    Colorectal Cancer Screening  04/27/2033       Flex Sigmoidoscopy Screen  Every 5 years No results found for this or any previous visit.    Fecal Occult Blood  Annually No results found for: \"FOB\", \"OCCULTSTOOL\"    Obesity Screening Screen all adults annually Body mass index is 41.1 kg/m².      Preventive Services for Which Recommendations Vary with Risk Recommendation Internal Lab or Procedure External Lab or Procedure   Cholesterol Screening Recommended screening varies with age, risk and gender LDL-CHOLESTEROL (mg/dL (calc))   Date Value   02/21/2023 134 (H)       Diabetes Screening  If history of high blood pressure or other  risk factors HEMOGLOBIN A1c (% of total Hgb)   Date Value   02/21/2023 5.7 (H)     GLUCOSE (mg/dL)   Date Value   02/21/2023 93         Gonorrhea Screening If high risk No results found for: \"GONOCOCCUS\"    HIV Screening For all adults age 18-65, older adults at increased risk No results found for: \"HIV\"    Syphilis Screening Screen if pregnant or high risk No results found for: \"RPR\"    Hepatitis C Screening Screen those at high risk plus screen one time for adults born 1947-1 965 No results found for: \"HCVAB\"    Tuberculosis Screen If high risk No components found for: \"PPDINDURAT\"      Disease Monitoring:    SPECIFIC DISEASE MONITORING Internal Lab or Procedure External Lab  or Procedure   Annual Monitoring of Persistent     Medications (ACE/ARB, digoxin, diuretics)    Potassium  Annually POTASSIUM (mmol/L)   Date Value   02/21/2023 4.8         No data to display                Creatinine  Annually CREATININE (mg/dL)   Date Value   02/21/2023 1.09         No data to display                Digoxin Serum Conc  Annually No results found for: \"DIGOXIN\"      No data to display                Diabetes      HgbA1C  Annually HEMOGLOBIN A1c (% of total Hgb)   Date Value   02/21/2023 5.7 (H)         No data to display                Creat/alb ratio  Annually CREATININE (mg/dL)   Date Value   02/21/2023 1.09        LDL  Annually LDL-CHOLESTEROL (mg/dL (calc))   Date Value   02/21/2023 134 (H)         No data to display                 Dilated Eye exam  Annually      No data to display                   No data to display                Asthma  (Annually between Nov. 1 & Dec. 31)    Date of last AAP/ACT and counseling given on importance of controller meds.                   ALLERGIES:     Allergies   Allergen Reactions    Iodine (Topical) HIVES and RASH    Radiology Contrast Iodinated Dyes RASH       CURRENT MEDICATIONS:   Current Outpatient Medications   Medication Sig Dispense Refill    atorvastatin 20 MG Oral Tab Take 1 tablet (20 mg total) by mouth nightly. (Patient not taking: Reported on 4/23/2024) 90 tablet 0    semaglutide-weight management 0.5 MG/0.5ML Subcutaneous Solution Auto-injector Inject 0.5 mL (0.5 mg total) into the skin once a week. (Patient not taking: Reported on 4/23/2024) 2 mL 1    metoprolol succinate ER 50 MG Oral Tablet 24 Hr Take 1 tablet (50 mg total) by mouth daily. (Patient not taking: Reported on 4/23/2024) 90 tablet 1    PEG 3350-KCl-NaBcb-NaCl-NaSulf (PEG-3350/ELECTROLYTES) 236 g Oral Recon Soln Take as directed by physician (Patient not taking: Reported on 4/23/2024) 4000 mL 0    Acetaminophen 500 MG Oral Cap Take 2 capsules (1,000 mg total) by mouth every 8  (eight) hours. (Patient not taking: Reported on 4/23/2024)      aspirin 81 MG Oral Chew Tab Chew 1 tablet (81 mg total) by mouth. (Patient not taking: Reported on 4/23/2024)        MEDICAL INFORMATION:   Past Medical History:    Arrhythmia    SVT    Decorative tattoo    GERD without esophagitis    Heart palpitations    High blood pressure    History of pneumonia    Obstructive apnea    [facility] SPLIT AHI 76 autoPAP 10-20 [employer]    Pneumonia    Sleep apnea    SVT (supraventricular tachycardia) ([facility])    Weight loss      Past Surgical History:   Procedure Laterality Date    Upper gi egd, histology - jar(s): 6  11/2017    Dr Caldera- H pylori positive bx. severe gastritis      Family History   Problem Relation Age of Onset    Hypertension Father     Cancer Father         Cancer Colon     Colon Cancer Father     Diabetes Mother     Hypertension Mother     Cancer Sister 52        brain, liver    Diabetes Sister     Diabetes Sister     Breast Cancer Sister     Heart Disorder Sister     No Known Problems Daughter     No Known Problems Son     Heart Disorder Brother     No Known Problems Daughter     No Known Problems Daughter     No Known Problems Brother     Heart Disorder Sister     Stroke Brother         7-13 2022      SOCIAL HISTORY:   Social History     Socioeconomic History    Marital status: Single   Tobacco Use    Smoking status: Never    Smokeless tobacco: Never   Vaping Use    Vaping status: Never Used   Substance and Sexual Activity    Alcohol use: Yes     Alcohol/week: 6.0 standard drinks of alcohol     Types: 6 Cans of beer per week     Comment: on weekends    Drug use: No   Other Topics Concern    Caffeine Concern Yes     Comment: 2 per week monsters    Exercise Yes     Comment: walking/jogging pushups    Seat Belt Yes    Special Diet No    Stress Concern Yes    Weight Concern Yes     Social Determinants of Health      [facility]    Social  Connections    [facility]    Housing Stability     Occ:  : engaged       REVIEW OF SYSTEMS:   GENERAL: feels well otherwise  SKIN: denies any unusual skin lesions  EYES: denies blurred vision or double vision  HEENT: denies nasal congestion, sinus pain or ST  LUNGS: denies shortness of breath with exertion  CARDIOVASCULAR: denies chest pain on exertion  GI: Epigastric pain x 3 weeks, denies heartburn  : denies nocturia or changes in stream  MUSCULOSKELETAL: denies back pain  NEURO: denies headaches  PSYCHE: denies depression or anxiety  HEMATOLOGIC: denies hx of anemia  ENDOCRINE: denies thyroid history  ALL/ASTHMA: denies hx of allergy or asthma    EXAM:   /80 (BP Location: Left arm, Patient Position: Sitting, Cuff Size: large)   Pulse 74   Temp 97.2 °F (36.2 °C) (Temporal)   Resp 16   Ht 5' 5\" (1.651 m)   Wt 247 lb (112 kg)   SpO2 98%   BMI 41.10 kg/m²   GENERAL: well developed, well nourished, in no apparent distress  SKIN: no rashes, no suspicious lesions, left lateral periorbital area this is a 3 mm flesh-colored skin protectant projection  HEENT: atraumatic, normocephalic, ears clear bilateral, wearing mask   EYES:PERRLA, EOMI, normal optic disk, conjunctiva are clear  NECK: supple, no adenopathy, no bruits  CHEST: no chest tenderness  BREAST: no dominant or suspicious mass  LUNGS: clear to auscultation  CARDIO: RRR without murmur  GI: good BS's, no masses, HSM or tenderness  :declined-no conerns  RECTAL: declined- no concerns  MUSCULOSKELETAL: back is not tender, FROM of the back  EXTREMITIES: no cyanosis, clubbing or edema  NEURO: Oriented times three, cranial nerves are intact, motor and sensory are grossly intact    ASSESSMENT AND OTHER RELEVANT CHRONIC CONDITIONS:   Noman Reyes is a 48 year old male who presents for an Annual Health Assessment.     PLAN SUMMARY:   1. Annual physical exam  -Encourage Mediterranean  diet  -Encouraged exercise 30 minutes to 60 minutes 3-5 times weekly 150-300min to prevent obesity and chronic disease and eliminate stress and its effect on the body.  -encouraged to continue not smoking  -safe sex practices - recommend condom use  -mammogram order given  -immunizations-needs MMR #2 and will start hepatitis A/B #1.  Flu shot and COVID-19 booster recommended annually in fall- today  -Vitamin D3  2000 units daily recommended  -Needs 1000 mg of calcium daily for osteoporosis prevention discussed  -thin prep pap recommended every 3 years due 12/7/18  -mammogram order placed screening  - CBC With Differential With Platelet  - Comp Metabolic Panel  - Lipid Panel  - Hemoglobin A1C  - TSH W Reflex To Free T4  - MMR [06881]  - Hep A & Hep B ([facility]) 18yrs & older [69014]  - PSA - Total [5363][Q]    2. History of hypertension  - Comp Metabolic Panel  - COMP METABOLIC PANEL [21054] [Q]; Future  - COMP METABOLIC PANEL [97762] [Q]  Restart metoprolol due to history of SVT  Continue low-sodium Mediterranean type diet  Encourage exercise 150 to 300 minutes weekly  Continue home blood pressure monitoring  Labs and OV every 6 months    3. Prediabetes  - Hemoglobin A1C  - HGB A1C [496] [Q]; Future  - HGB A1C [496] [Q]  Encouraged weight loss, Mediterranean low-carb diet, regular exercise 150-300 minutes weekly  A1c every 6 months    4. Dyslipidemia  - Comp Metabolic Panel  - Lipid Panel  -Restart atorvastatin 20 MG Oral Tab; Take 1 tablet (20 mg total) by mouth nightly.  Dispense: 90 tablet; Refill: 1  - COMP METABOLIC PANEL [61571] [Q]; Future  - LIPID PANEL [7600] [Q]; Future  - COMP METABOLIC PANEL [95912] [Q]  - LIPID PANEL [7600] [Q]  Risks and benefits of atorvastatin -discussed risk of hepatitis, muscle breakdown/rhabdomyolysis.  if joint pain or myalgias,or other side effects, stop medication immediately and call office.  Continue atorvastatin  encourage mediterranean diet  Exercise brisk walk 30-60 mins at  least 5 days weekly  Lose weight if overweight  Recheck fasting labs now and in 6 months    5. Family history of colon cancer in father  Up-to-date on colonoscopy completed 4/7/2023-normal  Father diagnosed at 65  Per GI recall is 10 years.    6. Class 3 severe obesity due to excess calories with serious comorbidity and body mass index (BMI) of 40.0 to 44.9 in adult ([facility])  -Goal BMI 25.0  -weight loss recommended due to increased long term health risks of diabetes, CAD, stroke, HTN and cancer  -follow Mediterranean diet  -options discussed- weight watchers, referral weight loss clinic, if no improvement consider bariatric procedure  -Minimum exercise 30 minutes 5 days a week aerobic activity goals 150 to 300 minutes weekly.  - [facility] - Kasey House APRN [address], [address]    7. SVT (supraventricular tachycardia) ([facility])  -Restart metoprolol succinate ER 50 MG Oral Tablet 24 Hr; Take 1 tablet (50 mg total) by mouth daily.  Dispense: 90 tablet; Refill: 1  Some palpitations-uncertain to the SVT since are sporadic  Continue CPAP  Recheck every 6 months sooner if symptoms persist may need to see cardiologist.  Previously declined ablation.    8. Acute gastritis without hemorrhage, unspecified gastritis type  -Start omeprazole 40 MG Oral Capsule Delayed Release; 1 tab po q am on empty stomach 45mins AC breakfast  Dispense: 30 capsule; Refill: 1  Discussed heartburn diet, 2 common triggers acidic foods i.e. tomatoes, lemon, lime, tomato sauce, salsa, pizza, alcohol, caffeine, fatty foods, spicy food excetra  Trial 30 days of PPI/omeprazole if not improving then may need workup of gallbladder ordered GI consult.    9. Screening for prostate cancer  - PSA - Total [5363][Q]    10. Skin lesion of face  - Derm Referral - In Network  Suspect skin tag lateral eye appears benign but patient wants removed    11. Severe obstructive sleep apnea  Schedule annual follow-up with   Marilynn Acosta    The patient indicates understanding of these issues and agrees to the plan.  Return in about 6 months (around 10/23/2024) for blood pressure, cholesterol prediabetes medication monitoring,discuss lab. recheck 1 month gastritis.    Diet counseling perfomed  Exercise counseling perfomed    SUGGESTED VACCINATIONS - Influenza, Pneumococcal, Zoster, Tetanus     Immunization History   Administered Date(s) Administered    Covid-19 Vaccine Moderna 100 mcg/0.5 ml 03/05/2021, 04/05/2021    FLULAVAL 6 months & older 0.5 ml Prefilled syringe (32243) 12/12/2017, 11/01/2021    FLUZONE 6 months and older PFS 0.5 ml (50847) 12/12/2017    Influenza 10/15/2019    Pneumococcal Conjugate PCV20 02/09/2023    Pneumovax 23 07/28/2021    Positive Measles Titer 12/12/2017    Positive Mumps Titer 12/12/2017    Positive Rubella Titer 12/12/2017    TDAP 12/12/2017    Varicella Deferred (Had Chicken Pox) 11/01/1995       Influenza Annually   Pneumococcal if high risk   Td/Tdap once then every 10 years   HPV Males 11-21   Zoster (Shingles) 60 and older: one dose   Varicella 2 doses if not immune   MMR 1-2 doses if born after 1958 and not immune

## 2024-04-23 NOTE — PATIENT INSTRUCTIONS
Perform labs fasting 8 hours with water or black coffee or or black tea diet  soda only prior to exam.    -Encourage healthy diet of whole food and avoid processed food and sugary drinks and sodas.  Diet should include lean meats and vegetables including 5-7 servings of fruit and vegetables total in 1 day.  Never skip breakfast.  -Encouraged exercise 30 minutes or more 5 times weekly to prevent obesity and chronic disease and eliminate stress and its effect on the body. Total 150mins weekly or more.  -encouraged to continue not smoking  - recommend condom use per CDC recommendation for all  or unmarried couples  -  immunizations- annual influenza vaccine recommended  -Vitamin D3 1000- 2000 units daily recommended  -Needs 1000mg of calcium daily for osteoporosis prevention discussed. Need to ingest 1000mg of calcium daily to prevent osteoporosis later in life.

## 2024-05-16 LAB
ABSOLUTE BASOPHILS: 30 CELLS/UL (ref 0–200)
ABSOLUTE EOSINOPHILS: 142 CELLS/UL (ref 15–500)
ABSOLUTE LYMPHOCYTES: 2563 CELLS/UL (ref 850–3900)
ABSOLUTE MONOCYTES: 396 CELLS/UL (ref 200–950)
ABSOLUTE NEUTROPHILS: 1170 CELLS/UL (ref 1500–7800)
ALBUMIN/GLOBULIN RATIO: 1.3 (CALC) (ref 1–2.5)
ALBUMIN: 4.3 G/DL (ref 3.6–5.1)
ALKALINE PHOSPHATASE: 41 U/L (ref 35–144)
ALT: 22 U/L (ref 9–46)
AST: 25 U/L (ref 10–35)
BASOPHILS: 0.7 %
BILIRUBIN, TOTAL: 0.3 MG/DL (ref 0.2–1.2)
BUN: 10 MG/DL (ref 7–25)
CALCIUM: 9.5 MG/DL (ref 8.6–10.3)
CARBON DIOXIDE: 29 MMOL/L (ref 20–32)
CHLORIDE: 104 MMOL/L (ref 98–110)
CHOL/HDLC RATIO: 3.9 (CALC)
CHOLESTEROL, TOTAL: 131 MG/DL
CREATININE: 1.06 MG/DL (ref 0.7–1.3)
EGFR: 85 ML/MIN/1.73M2
EOSINOPHILS: 3.3 %
GLOBULIN: 3.2 G/DL (CALC) (ref 1.9–3.7)
GLUCOSE: 102 MG/DL (ref 65–99)
HDL CHOLESTEROL: 34 MG/DL
HEMATOCRIT: 40.4 % (ref 38.5–50)
HEMOGLOBIN A1C: 6 % OF TOTAL HGB
HEMOGLOBIN: 13.3 G/DL (ref 13.2–17.1)
LDL-CHOLESTEROL: 79 MG/DL (CALC)
LYMPHOCYTES: 59.6 %
MCH: 28.9 PG (ref 27–33)
MCHC: 32.9 G/DL (ref 32–36)
MCV: 87.8 FL (ref 80–100)
MONOCYTES: 9.2 %
MPV: 11.7 FL (ref 7.5–12.5)
NEUTROPHILS: 27.2 %
NON-HDL CHOLESTEROL: 97 MG/DL (CALC)
PLATELET COUNT: 225 THOUSAND/UL (ref 140–400)
POTASSIUM: 4.5 MMOL/L (ref 3.5–5.3)
PROTEIN, TOTAL: 7.5 G/DL (ref 6.1–8.1)
PSA, TOTAL: 0.27 NG/ML
RDW: 12.8 % (ref 11–15)
RED BLOOD CELL COUNT: 4.6 MILLION/UL (ref 4.2–5.8)
SODIUM: 140 MMOL/L (ref 135–146)
TRIGLYCERIDES: 99 MG/DL
TSH W/REFLEX TO FT4: 1.64 MIU/L (ref 0.4–4.5)
WHITE BLOOD CELL COUNT: 4.3 THOUSAND/UL (ref 3.8–10.8)

## 2024-05-23 ENCOUNTER — OFFICE VISIT (OUTPATIENT)
Dept: FAMILY MEDICINE CLINIC | Facility: CLINIC | Age: 49
End: 2024-05-23

## 2024-05-23 VITALS
WEIGHT: 243.63 LBS | SYSTOLIC BLOOD PRESSURE: 126 MMHG | RESPIRATION RATE: 18 BRPM | BODY MASS INDEX: 40.59 KG/M2 | TEMPERATURE: 97 F | DIASTOLIC BLOOD PRESSURE: 78 MMHG | HEIGHT: 65 IN | OXYGEN SATURATION: 97 % | HEART RATE: 57 BPM

## 2024-05-23 DIAGNOSIS — Z87.19 HISTORY OF GASTRITIS: Primary | ICD-10-CM

## 2024-05-23 DIAGNOSIS — R73.03 PREDIABETES: ICD-10-CM

## 2024-05-23 DIAGNOSIS — I47.10 SVT (SUPRAVENTRICULAR TACHYCARDIA) (HCC): ICD-10-CM

## 2024-05-23 DIAGNOSIS — G47.33 SEVERE OBSTRUCTIVE SLEEP APNEA: ICD-10-CM

## 2024-05-23 PROCEDURE — 99213 OFFICE O/P EST LOW 20 MIN: CPT | Performed by: FAMILY MEDICINE

## 2024-05-23 NOTE — PROGRESS NOTES
CHIEF COMPLAINT:   Chief Complaint   Patient presents with    Follow - Up     Discuss lab results          HPI:     Noman Reyes is a 48 year old male presents for follow-up gastritis.  Was prescribed omeprazole 40 mg at last visit.  Patient states abdominal pain epigastric pain completely resolved.  He was eating spicy food and greasy food.  Denies any melena, epigastric pain, heartburn GERD, acid reflux, dysphagia, unexplained weight loss.  Has not experienced any symptoms in a couple of weeks now.  Denies any side effects with omeprazole.    History of severe sleep apnea using CPAP nightly but needs a new machine.  Some fatigue in the morning.  Previously managed by Dr. Subramanian.  Working on weight loss.    Prediabetes-denies polyuria, polydipsia or vision changes.  Trying to walk 30 minutes daily.  Working on losing half a pound to a pound a week.  Trying to eat healthier.  Last A1c:  HEMOGLOBIN A1c (% of total Hgb)   Date Value   05/15/2024 6.0 (H)   02/21/2023 5.7 (H)   07/23/2021 5.7 (H)     History of SVT-taking metoprolol daily.  Denies any palpitations, dizziness, chest pain, breathing issues.  Denies medication side effects with metoprolol.      Prior partial HPI 4/23/2024  Patient's father passed away from metastatic colon cancer a few months ago and patient was traveling to [place] frequently and states he will let his help go.  Stopped all of his medications-ran out.  Cut sodium in his diet and blood pressure has been controlled.  However-patient has history of SVT and has had some palpitations lately.  Denies any chest pain, dizziness or shortness of breath when occurred.    Complains of epigastric pain-nonradiating with eating x 3 weeks.  Admitting to eating spicy food which she has given up and drinking coffee.  States every time he eats he has discomfort in the mid epigastric area.  Can last 1 to 2 hours.  Uncertain what relieves his pain.  Denies any nausea or vomiting.  Pain is not severe.   Stools are brown.  Denies blood in the stool or black stool.  No history of ulcers.  Has not been taking any NSAIDs.  Under stress with losing his father.  Occasional acid reflux, occasional burping.  Denies dysphagia.      Prediabetes-last A1c  HEMOGLOBIN A1c (% of total Hgb)   Date Value   02/21/2023 5.7 (H)   07/23/2021 5.7 (H)     HgbA1C (%)   Date Value   01/13/2020 6.5 (H)   Previously on Rybelsus lost 18 pounds.  Denied any side effects with medication would like to start weight loss medication.  Brother had a stroke in 2023.  Wants to be proactive with his health.  Gave up eating salt.  Blood pressures been controlled.  Denies family history of multiple endocrine neoplasia type II, thyroid cancer or personal history of thyroid cancer or pancreatitis.  Walking an hour 3 miles a day.  Feels \"great\".  Denies polyuria polydipsia.  Denies any side effects with Rybelsus.    Compliant with cpap machine -sleep apnea severe. SVT arythmia mostly at night time. Last visit with  9/2021-using machine and equipment. Denies fatigue, am headaches.history of SVT not flaring but has had some palpitations.  Previously had palpitations at night when sleep apnea was not controlled.  Is overdue for follow-up with Dr. Subramanian    Pt presents for recheck of hyperlipidemia. Patient reports taking medications as instructed, no medication side effects noted. Denies any generalized muscle aches.  Taking atorvastatin is overdue for repeat labs hepatic function and lipid panel ordered 8/2021    History of HTN- off medications. Avoids sodium and walking 3-4 miles a daily. Wants to lose weight.  Denies headache, chest pain, shortness of breath, palpitations, leg swelling.      HISTORY:  Past Medical History:    Arrhythmia    SVT    Decorative tattoo    GERD without esophagitis    Heart palpitations    High blood pressure    History of pneumonia    Obstructive apnea    Edward SPLIT AHI 76 autoPAP 10-20 [employer]    Pneumonia    Sleep  apnea    SVT (supraventricular tachycardia) (HCC)    Weight loss      Past Surgical History:   Procedure Laterality Date    Upper gi egd, histology - jar(s): 6  11/2017    Dr Caldera- H pylori positive bx. severe gastritis      Family History   Problem Relation Age of Onset    Hypertension Father     Cancer Father         Cancer Colon     Colon Cancer Father     Diabetes Mother     Hypertension Mother     Cancer Sister 52        brain, liver    Diabetes Sister     Diabetes Sister     Breast Cancer Sister     Heart Disorder Sister     No Known Problems Daughter     No Known Problems Son     Heart Disorder Brother     No Known Problems Daughter     No Known Problems Daughter     No Known Problems Brother     Heart Disorder Sister     Stroke Brother         7-13 2022      Social History:   Social History     Socioeconomic History    Marital status: Single   Tobacco Use    Smoking status: Never    Smokeless tobacco: Never   Vaping Use    Vaping status: Never Used   Substance and Sexual Activity    Alcohol use: Yes     Alcohol/week: 6.0 standard drinks of alcohol     Types: 6 Cans of beer per week     Comment: on weekends    Drug use: No   Other Topics Concern    Caffeine Concern Yes     Comment: 2 per week monsters    Exercise Yes     Comment: walking/jogging pushups    Seat Belt Yes    Special Diet No    Stress Concern Yes    Weight Concern Yes     Social Determinants of Health      [facility]    Social Connections    [facility]    Housing Stability        Medications (Active prior to today's visit):  Current Outpatient Medications   Medication Sig Dispense Refill    atorvastatin 20 MG Oral Tab Take 1 tablet (20 mg total) by mouth nightly. 90 tablet 1    metoprolol succinate ER 50 MG Oral Tablet 24 Hr Take 1 tablet (50 mg total) by mouth daily. 90 tablet 1    Omeprazole 40 MG Oral Capsule  Delayed Release 1 tab po q am on empty stomach 45mins AC breakfast 30 capsule 1    Acetaminophen 500 MG Oral Cap Take 2 capsules (1,000 mg total) by mouth every 8 (eight) hours.         Allergies:  Allergies   Allergen Reactions    Iodine (Topical) HIVES and RASH    Radiology Contrast Iodinated Dyes RASH       PSFH elements reviewed from today and agreed except as otherwise stated in HPI.  PHYSICAL EXAM:   /78 (BP Location: Left arm, Patient Position: Sitting, Cuff Size: large)   Pulse 57   Temp 97.3 °F (36.3 °C) (Temporal)   Resp 18   Ht 5' 5\" (1.651 m)   Wt 243 lb 9.6 oz (110.5 kg)   SpO2 97%   BMI 40.54 kg/m²   Vital signs reviewed.Appears stated age, well groomed.  Physical Exam   GENERAL: well developed, well nourished,in no apparent distress  EYES; PERRLA, EOM-i B/L. Sclera clear and non icteric bilateral  SKIN: no rashes,no suspicious lesions  HEENT: atraumatic, normocephalic  NECK: supple,no adenopathy,no bruits, no JVD  LUNGS: clear to auscultation bilateral. No RRW. Good inspiratory and expiratory effort  CARDIO: RRR without murmur, clear S1, S2  VS: no carotid artery bruit bilateral.    GI: good BS's,no masses,No HSM or tenderness.   EXTREMITIES: no cyanosis, clubbing or edema, bilateral. No calf tenderness    LABS     Office Visit on 04/23/2024   Component Date Value    GLUCOSE 05/15/2024 102 (H)     UREA NITROGEN (BUN) 05/15/2024 10     CREATININE 05/15/2024 1.06     EGFR 05/15/2024 85     BUN/CREATININE RATIO 05/15/2024 SEE NOTE:     SODIUM 05/15/2024 140     POTASSIUM 05/15/2024 4.5     CHLORIDE 05/15/2024 104     CARBON DIOXIDE 05/15/2024 29     CALCIUM 05/15/2024 9.5     PROTEIN, TOTAL 05/15/2024 7.5     ALBUMIN 05/15/2024 4.3     GLOBULIN 05/15/2024 3.2     ALBUMIN/GLOBULIN RATIO 05/15/2024 1.3     BILIRUBIN, TOTAL 05/15/2024 0.3     ALKALINE PHOSPHATASE 05/15/2024 41     AST 05/15/2024 25     ALT 05/15/2024 22     CHOLESTEROL, TOTAL 05/15/2024 131     HDL CHOLESTEROL 05/15/2024 34 (L)      TRIGLYCERIDES 05/15/2024 99     LDL-CHOLESTEROL 05/15/2024 79     CHOL/HDLC RATIO 05/15/2024 3.9     NON-HDL CHOLESTEROL 05/15/2024 97     HEMOGLOBIN A1c 05/15/2024 6.0 (H)     WHITE BLOOD CELL COUNT 05/15/2024 4.3     RED BLOOD CELL COUNT 05/15/2024 4.60     HEMOGLOBIN 05/15/2024 13.3     HEMATOCRIT 05/15/2024 40.4     MCV 05/15/2024 87.8     MCH 05/15/2024 28.9     MCHC 05/15/2024 32.9     RDW 05/15/2024 12.8     PLATELET COUNT 05/15/2024 225     MPV 05/15/2024 11.7     ABSOLUTE NEUTROPHILS 05/15/2024 1,170 (L)     ABSOLUTE LYMPHOCYTES 05/15/2024 2,563     ABSOLUTE MONOCYTES 05/15/2024 396     ABSOLUTE EOSINOPHILS 05/15/2024 142     ABSOLUTE BASOPHILS 05/15/2024 30     NEUTROPHILS 05/15/2024 27.2     LYMPHOCYTES 05/15/2024 59.6     MONOCYTES 05/15/2024 9.2     EOSINOPHILS 05/15/2024 3.3     BASOPHILS 05/15/2024 0.7     PSA, TOTAL 05/15/2024 0.27     TSH W/REFLEX TO FT4 05/15/2024 1.64       REVIEWED THIS VISIT  ASSESSMENT/PLAN:   48 year old male with    1. History of gastritis  Resolved  Discussed trigger foods and reviewed heartburn diet.  Avoid spicy foods fatty foods, excessive caffeine limit to 1 cup of coffee a day.  If symptoms return then may need workup for gallbladder disease.  Liver function is completely normal.  Finish omeprazole then may discontinue    2. Severe obstructive sleep apnea  - Pulmonary Referral - In [facility]  Continue CPAP  Needs to schedule follow-up for new machine possibly new titration study of fatigued  Patient agrees  Previous SVT with flare at night due to apnea    3. Prediabetes  A1c increased to 6.0  At risk of type 2 diabetes  Discussed starting metformin but declined  Patient determined to lose weight and change lifestyle and improve health  Encourage Mediterranean low-carb diet  Walk 30 to 60 minutes daily  Reevaluate A1c in 6 months-if not improving will recommend metformin.    4. SVT (supraventricular tachycardia) (HCC)  Controlled  Continue metoprolol  Encourage  compliance with CPAP which patient agrees and is completing  Previously SVT was triggered at night due to sleep apnea  Denies any episodes    Meds This Visit:  Requested Prescriptions      No prescriptions requested or ordered in this encounter       Health Maintenance:  Health Maintenance   Topic Date Due    Zoster Vaccines (1 of 2) 05/23/2024 (Originally 11/1/2023)    COVID-19 Vaccine (3 - 2023-24 season) 05/23/2024 (Originally 9/1/2023)    Influenza Vaccine (Season Ended) 10/01/2024    Annual Physical  04/23/2025    PSA  05/15/2026    DTaP,Tdap,and Td Vaccines (2 - Td or Tdap) 12/12/2027    Colorectal Cancer Screening  04/27/2033    Annual Depression Screening  Completed    Pneumococcal Vaccine: Birth to 64yrs  Aged Out         Patient/Caregiver Education: Patient/Caregiver Education: There are no barriers to learning. Medical education done.   Outcome: Patient verbalizes understanding. Patient is notified to call with any questions, comp lications, allergies, or worsening or changing symptoms.  Patient is to call with any side effects or complications from the treatments as a result of today.     Problem List:     Patient Active Problem List   Diagnosis    Chest pain    Hypertension    GERD without esophagitis    SVT (supraventricular tachycardia) (HCC)    Severe obstructive sleep apnea    Dyslipidemia    Class 3 severe obesity due to excess calories with serious comorbidity and body mass index (BMI) of 40.0 to 44.9 in adult (HCC)    Screen for colon cancer    Family history of colon cancer in father    History of pneumonia    Prediabetes    Family history of breast cancer in first degree relative    Special screening for malignant neoplasm of colon    Skin lesion    Acute gastritis without hemorrhage       Imaging & Referrals:  None     5/23/2024  Yuni Marsh, DO      Patient understands plan and follow-up.  Return in about 5 months (around 10/23/2024) for medication monitoring, discuss labs, sooner if  needed..

## 2024-05-26 DIAGNOSIS — I47.10 SVT (SUPRAVENTRICULAR TACHYCARDIA) (HCC): ICD-10-CM

## 2024-05-29 RX ORDER — METOPROLOL SUCCINATE 50 MG/1
50 TABLET, EXTENDED RELEASE ORAL DAILY
Qty: 90 TABLET | Refills: 1 | OUTPATIENT
Start: 2024-05-29

## 2024-09-23 DIAGNOSIS — I47.10 SVT (SUPRAVENTRICULAR TACHYCARDIA) (HCC): ICD-10-CM

## 2024-09-23 RX ORDER — METOPROLOL SUCCINATE 50 MG/1
50 TABLET, EXTENDED RELEASE ORAL DAILY
Qty: 90 TABLET | Refills: 0 | Status: SHIPPED | OUTPATIENT
Start: 2024-09-23

## 2024-09-23 NOTE — TELEPHONE ENCOUNTER
Refill Passed Protocol:     Pt requesting refill of   Requested Prescriptions     Pending Prescriptions Disp Refills    metoprolol succinate ER 50 MG Oral Tablet 24 Hr [Pharmacy Med Name: METOPROLOL ER SUCCINATE 50MG TABS] 90 tablet 0     Sig: TAKE 1 TABLET(50 MG) BY MOUTH DAILY     Last Time Medication was Filled:  4/23/2024    Last Office Visit with Provider: 5/23/2024    Appt. scheduled on 11/22/2024      
[phone]

## 2025-01-29 ENCOUNTER — LAB ENCOUNTER (OUTPATIENT)
Dept: LAB | Facility: HOSPITAL | Age: 50
End: 2025-01-29
Attending: FAMILY MEDICINE
Payer: COMMERCIAL

## 2025-01-29 LAB
ALBUMIN SERPL-MCNC: 4.5 G/DL (ref 3.2–4.8)
ALBUMIN/GLOB SERPL: 1.3 {RATIO} (ref 1–2)
ALP LIVER SERPL-CCNC: 51 U/L
ALT SERPL-CCNC: 25 U/L
ANION GAP SERPL CALC-SCNC: 11 MMOL/L (ref 0–18)
AST SERPL-CCNC: 35 U/L (ref ?–34)
BASOPHILS # BLD AUTO: 0.03 X10(3) UL (ref 0–0.2)
BASOPHILS NFR BLD AUTO: 0.7 %
BILIRUB SERPL-MCNC: 0.4 MG/DL (ref 0.3–1.2)
BUN BLD-MCNC: 10 MG/DL (ref 9–23)
CALCIUM BLD-MCNC: 9.4 MG/DL (ref 8.7–10.6)
CHLORIDE SERPL-SCNC: 104 MMOL/L (ref 98–112)
CHOLEST SERPL-MCNC: 195 MG/DL (ref ?–200)
CO2 SERPL-SCNC: 25 MMOL/L (ref 21–32)
CREAT BLD-MCNC: 1.1 MG/DL
EGFRCR SERPLBLD CKD-EPI 2021: 81 ML/MIN/1.73M2 (ref 60–?)
EOSINOPHIL # BLD AUTO: 0.11 X10(3) UL (ref 0–0.7)
EOSINOPHIL NFR BLD AUTO: 2.6 %
ERYTHROCYTE [DISTWIDTH] IN BLOOD BY AUTOMATED COUNT: 13.2 %
EST. AVERAGE GLUCOSE BLD GHB EST-MCNC: 134 MG/DL (ref 68–126)
FASTING PATIENT LIPID ANSWER: YES
FASTING STATUS PATIENT QL REPORTED: YES
GLOBULIN PLAS-MCNC: 3.6 G/DL (ref 2–3.5)
GLUCOSE BLD-MCNC: 85 MG/DL (ref 70–99)
HBA1C MFR BLD: 6.3 % (ref ?–5.7)
HCT VFR BLD AUTO: 40.5 %
HDLC SERPL-MCNC: 33 MG/DL (ref 40–59)
HGB BLD-MCNC: 13.7 G/DL
IMM GRANULOCYTES # BLD AUTO: 0 X10(3) UL (ref 0–1)
IMM GRANULOCYTES NFR BLD: 0 %
LDLC SERPL CALC-MCNC: 142 MG/DL (ref ?–100)
LYMPHOCYTES # BLD AUTO: 2.32 X10(3) UL (ref 1–4)
LYMPHOCYTES NFR BLD AUTO: 54 %
MCH RBC QN AUTO: 29.5 PG (ref 26–34)
MCHC RBC AUTO-ENTMCNC: 33.8 G/DL (ref 31–37)
MCV RBC AUTO: 87.3 FL
MONOCYTES # BLD AUTO: 0.47 X10(3) UL (ref 0.1–1)
MONOCYTES NFR BLD AUTO: 10.9 %
NEUTROPHILS # BLD AUTO: 1.37 X10 (3) UL (ref 1.5–7.7)
NEUTROPHILS # BLD AUTO: 1.37 X10(3) UL (ref 1.5–7.7)
NEUTROPHILS NFR BLD AUTO: 31.8 %
NONHDLC SERPL-MCNC: 162 MG/DL (ref ?–130)
OSMOLALITY SERPL CALC.SUM OF ELEC: 288 MOSM/KG (ref 275–295)
PLATELET # BLD AUTO: 209 10(3)UL (ref 150–450)
POTASSIUM SERPL-SCNC: 4.6 MMOL/L (ref 3.5–5.1)
PROT SERPL-MCNC: 8.1 G/DL (ref 5.7–8.2)
PSA SERPL-MCNC: 0.29 NG/ML (ref ?–4)
RBC # BLD AUTO: 4.64 X10(6)UL
SODIUM SERPL-SCNC: 140 MMOL/L (ref 136–145)
TRIGL SERPL-MCNC: 107 MG/DL (ref 30–149)
TSI SER-ACNC: 1.42 UIU/ML (ref 0.55–4.78)
VLDLC SERPL CALC-MCNC: 20 MG/DL (ref 0–30)
WBC # BLD AUTO: 4.3 X10(3) UL (ref 4–11)

## 2025-01-29 PROCEDURE — 80053 COMPREHEN METABOLIC PANEL: CPT | Performed by: FAMILY MEDICINE

## 2025-01-29 PROCEDURE — 36415 COLL VENOUS BLD VENIPUNCTURE: CPT | Performed by: FAMILY MEDICINE

## 2025-01-29 PROCEDURE — 84443 ASSAY THYROID STIM HORMONE: CPT | Performed by: FAMILY MEDICINE

## 2025-01-29 PROCEDURE — 85025 COMPLETE CBC W/AUTO DIFF WBC: CPT | Performed by: FAMILY MEDICINE

## 2025-01-29 PROCEDURE — 83036 HEMOGLOBIN GLYCOSYLATED A1C: CPT | Performed by: FAMILY MEDICINE

## 2025-01-29 PROCEDURE — 80061 LIPID PANEL: CPT | Performed by: FAMILY MEDICINE

## 2025-01-29 PROCEDURE — 84153 ASSAY OF PSA TOTAL: CPT | Performed by: FAMILY MEDICINE

## 2025-02-10 ENCOUNTER — OFFICE VISIT (OUTPATIENT)
Dept: FAMILY MEDICINE CLINIC | Facility: CLINIC | Age: 50
End: 2025-02-10
Payer: COMMERCIAL

## 2025-02-10 VITALS
RESPIRATION RATE: 16 BRPM | BODY MASS INDEX: 39.45 KG/M2 | HEART RATE: 73 BPM | TEMPERATURE: 98 F | OXYGEN SATURATION: 97 % | HEIGHT: 66.9 IN | DIASTOLIC BLOOD PRESSURE: 80 MMHG | WEIGHT: 251.38 LBS | SYSTOLIC BLOOD PRESSURE: 136 MMHG

## 2025-02-10 DIAGNOSIS — K21.9 GERD WITHOUT ESOPHAGITIS: Chronic | ICD-10-CM

## 2025-02-10 DIAGNOSIS — Z23 NEED FOR VACCINATION: ICD-10-CM

## 2025-02-10 DIAGNOSIS — E66.812 CLASS 2 OBESITY DUE TO DISRUPTION OF MC4R PATHWAY WITH SERIOUS COMORBIDITY AND BODY MASS INDEX (BMI) OF 39.0 TO 39.9 IN ADULT: ICD-10-CM

## 2025-02-10 DIAGNOSIS — R73.03 PREDIABETES: ICD-10-CM

## 2025-02-10 DIAGNOSIS — E78.5 DYSLIPIDEMIA: ICD-10-CM

## 2025-02-10 DIAGNOSIS — R74.01 ELEVATED ALT MEASUREMENT: ICD-10-CM

## 2025-02-10 DIAGNOSIS — Z00.00 ANNUAL PHYSICAL EXAM: Primary | ICD-10-CM

## 2025-02-10 DIAGNOSIS — Z15.2 CLASS 2 OBESITY DUE TO DISRUPTION OF MC4R PATHWAY WITH SERIOUS COMORBIDITY AND BODY MASS INDEX (BMI) OF 39.0 TO 39.9 IN ADULT: ICD-10-CM

## 2025-02-10 DIAGNOSIS — I10 PRIMARY HYPERTENSION: Chronic | ICD-10-CM

## 2025-02-10 DIAGNOSIS — E88.82 CLASS 2 OBESITY DUE TO DISRUPTION OF MC4R PATHWAY WITH SERIOUS COMORBIDITY AND BODY MASS INDEX (BMI) OF 39.0 TO 39.9 IN ADULT: ICD-10-CM

## 2025-02-10 DIAGNOSIS — Z80.0 FAMILY HISTORY OF COLON CANCER IN FATHER: ICD-10-CM

## 2025-02-10 DIAGNOSIS — I47.10 SVT (SUPRAVENTRICULAR TACHYCARDIA) (HCC): ICD-10-CM

## 2025-02-10 DIAGNOSIS — G47.33 SEVERE OBSTRUCTIVE SLEEP APNEA: ICD-10-CM

## 2025-02-10 PROCEDURE — 99214 OFFICE O/P EST MOD 30 MIN: CPT | Performed by: FAMILY MEDICINE

## 2025-02-10 PROCEDURE — 99396 PREV VISIT EST AGE 40-64: CPT | Performed by: FAMILY MEDICINE

## 2025-02-10 PROCEDURE — 90656 IIV3 VACC NO PRSV 0.5 ML IM: CPT | Performed by: FAMILY MEDICINE

## 2025-02-10 PROCEDURE — 90471 IMMUNIZATION ADMIN: CPT | Performed by: FAMILY MEDICINE

## 2025-02-10 RX ORDER — METOPROLOL SUCCINATE 50 MG/1
50 TABLET, EXTENDED RELEASE ORAL DAILY
Qty: 90 TABLET | Refills: 1 | Status: SHIPPED | OUTPATIENT
Start: 2025-02-10

## 2025-02-10 RX ORDER — ATORVASTATIN CALCIUM 20 MG/1
20 TABLET, FILM COATED ORAL NIGHTLY
Qty: 90 TABLET | Refills: 1 | Status: SHIPPED | OUTPATIENT
Start: 2025-02-10

## 2025-02-10 NOTE — PROGRESS NOTES
Chief Complaint   Patient presents with    Physical     Annual exam        REASON FOR VISIT:    Noman Reyes is a 49 year old male who presents for an Annual Health Assessment and discuss labs 1/29/2025    Patient's father passed away from metastatic colon cancer a few months ago and patient was traveling to [place] frequently and states he will let his help go.  Stopped all of his medications-ran out.  Cut sodium in his diet and blood pressure has been controlle    Compliant with cpap machine -sleep apnea severe. SVT arythmia mostly at night time- having error message on cpap at night with heater. Last visit with  7/2023 -using machine and equipment. Denies fatigue, am headaches.history of SVT not flaring but has had some palpitations.  Previously had palpitations at night when sleep apnea was not controlled.  Is overdue for follow-up with Dr. Subramanian     history of SVT -off metoprolol x 2 weeks- ran out. denies palpitations..  Denies any chest pain, dizziness or shortness of breath when occurred.    Prediabetes-last A1c  HEMOGLOBIN A1c (% of total Hgb)   Date Value   05/15/2024 6.0 (H)   02/21/2023 5.7 (H)   07/23/2021 5.7 (H)     HgbA1C (%)   Date Value   01/29/2025 6.3 (H)   Previously on Rybelsus lost 18 pounds.  Denied any side effects with medication    Brother had a stroke in 2023.  Wants to be proactive with his health.  Gave up eating salt.  Blood pressures been controlled.  Denies family history of multiple endocrine neoplasia type II, thyroid cancer or personal history of thyroid cancer or pancreatitis.  Walking 302 minutes on treadmill 2.5miles 4x week.  Denies polyuria polydipsia, vision changes.  Denies any side effects with Rybelsus.    Pt presents for recheck of hyperlipidemia. Patient reports taking medications as instructed, no medication side effects noted. Denies any generalized muscle aches.  Taking atorvastatin is overdue for repeat labs hepatic function and lipid panel ordered  2021    History of HTN- off medications- ran out of metoprolol. Avoids sodium and walking 3-4 miles a daily. Wants to lose weight.  Denies headache, chest pain, shortness of breath, palpitations, leg swelling.      , monogamous  On vit D daily -not taking   MVI- no  Calcium- no  Colonoscopy-2023-recall with 10 years. FH of colon cancer in father  in 80's. Diagnosed age 75mg/  PSA 0. 29 NG per mL on 2025, denies nocturia, hematuria, hesitancy, incontinence, ED  Exercise- walking daily 1 hour,  walked 10 miles in one day  Diet -vegetables 1x, salmon, chicken, some fruit, eats out restaurant  Dentist regularly- yes, brush teeth  Annual eye exam- no glasses. yes  Etoh: 3 drinks per week- 1-2 beers at at time.  Cigs: never, no vaping  Illicits: denies, denies marijuana use  Immunizations: Needs MMR #2-does not recall if ever given, needs hep a and B  FH significant: reviewed, denies family history of prostate cancer  Family History   Problem Relation Age of Onset    Hypertension Father     Cancer Father         Cancer Colon     Colon Cancer Father     Diabetes Mother     Hypertension Mother     Cancer Sister 52        brain, liver    Diabetes Sister     Diabetes Sister     Breast Cancer Sister     Heart Disorder Sister     No Known Problems Daughter     No Known Problems Son     Heart Disorder Brother     No Known Problems Daughter     No Known Problems Daughter     No Known Problems Brother     Heart Disorder Sister     Stroke Brother                  Wt Readings from Last 6 Encounters:   02/10/25 251 lb 6.4 oz (114 kg)   24 243 lb 9.6 oz (110.5 kg)   24 247 lb (112 kg)   23 240 lb (108.9 kg)   23 251 lb 12.8 oz (114.2 kg)   21 223 lb 6.4 oz (101.3 kg)         Patient Active Problem List   Diagnosis    Chest pain    Hypertension    GERD without esophagitis    SVT (supraventricular tachycardia) (HCC)    Severe obstructive sleep apnea    Dyslipidemia     Class 3 severe obesity due to excess calories with serious comorbidity and body mass index (BMI) of 40.0 to 44.9 in adult (HCC)    Screen for colon cancer    Family history of colon cancer in father    History of pneumonia    Prediabetes    Family history of breast cancer in first degree relative    Special screening for malignant neoplasm of colon    Skin lesion    Acute gastritis without hemorrhage     Current Outpatient Medications   Medication Sig Dispense Refill    metoprolol succinate ER 50 MG Oral Tablet 24 Hr TAKE 1 TABLET(50 MG) BY MOUTH DAILY 90 tablet 0    atorvastatin 20 MG Oral Tab Take 1 tablet (20 mg total) by mouth nightly. 90 tablet 1    Omeprazole 40 MG Oral Capsule Delayed Release 1 tab po q am on empty stomach 45mins AC breakfast 30 capsule 1    Acetaminophen 500 MG Oral Cap Take 2 capsules (1,000 mg total) by mouth every 8 (eight) hours.       Wt Readings from Last 6 Encounters:   02/10/25 251 lb 6.4 oz (114 kg)   05/23/24 243 lb 9.6 oz (110.5 kg)   04/23/24 247 lb (112 kg)   04/08/23 240 lb (108.9 kg)   02/09/23 251 lb 12.8 oz (114.2 kg)   11/01/21 223 lb 6.4 oz (101.3 kg)     Body mass index is 39.49 kg/m².    No results found for: \"GLUCOSE\"  Lab Results   Component Value Date    CHOLEST 195 01/29/2025    CHOLEST 131 05/15/2024    CHOLEST 187 02/21/2023     Lab Results   Component Value Date    HDL 33 (L) 01/29/2025    HDL 34 (L) 05/15/2024    HDL 36 (L) 02/21/2023     No results found for: \"TRIGLY\"  Lab Results   Component Value Date     (H) 01/29/2025    LDL 79 05/15/2024     (H) 02/21/2023     Lab Results   Component Value Date    AST 35 (H) 01/29/2025    AST 25 05/15/2024    AST 29 02/21/2023     Lab Results   Component Value Date    ALT 25 01/29/2025    ALT 22 05/15/2024    ALT 24 02/21/2023     Lab Results   Component Value Date    TSH 1.418 01/29/2025    TSH 1.070 10/26/2019    TSH 1.290 12/12/2017     Lab Results   Component Value Date    BUN 10 01/29/2025    BUN 10  05/15/2024    BUN 10 02/21/2023    CREATSERUM 1.10 01/29/2025    CREATSERUM 1.06 05/15/2024    CREATSERUM 1.09 02/21/2023     Lab Results   Component Value Date    PSA 0.29 01/29/2025       General Health     How would you describe your current health state?: Fair    Type of Diet: Other;Low Salt (no diet)    How do you maintain positive mental well-being?: Visiting Friends;Visiting Family    How would you describe your daily physical activity?: Moderate    If you are a male age 45-79 or a female age 55-79, do you take aspirin?: No    Have you had any immunizations at another office such as Influenza, Hepatitis B, Tetanus, or Pneumococcal?: No    At any time do you feel concerned for the safety/well-being of yourself and/or your children, in your home or elsewhere?: No     CAGE:     Cut: Have you ever felt you should Cut down on your drinking?: No    Annoyed: Have people Annoyed you by criticizing your drinking?: No    Guilty: Have you ever felt bad or Guilty about your drinking?: No    Eye Opener: Have you ever had a drink first thing in the morning to steady your nerves or to get rid of a hangover (Eye opener)?: No    Scoring  Total Score: 0     Depression Screening (PHQ-2/PHQ-9): Over the LAST 2 WEEKS   Little interest or pleasure in doing things (over the last two weeks)?: Not at all    Feeling down, depressed, or hopeless (over the last two weeks)?: Not at all    PHQ-2 SCORE: 0   Lost father to metastatic colon cancer 2-3 months ago.  Grieving.  Does not want medication.  Feels managing and feelings are appropriate.     PREVENTATIVE SERVICES  INDICATIONS AND SCHEDULE Recommendation Internal Lab or Procedure External Lab or Procedure   Colonoscopy Screen Every 10 years Health Maintenance   Topic Date Due    Colorectal Cancer Screening  04/27/2033       Flex Sigmoidoscopy Screen  Every 5 years No results found for this or any previous visit.    Fecal Occult Blood  Annually No results found for: \"FOB\",  \"OCCULTSTOOL\"    Obesity Screening Screen all adults annually Body mass index is 39.49 kg/m².      Preventive Services for Which Recommendations Vary with Risk Recommendation Internal Lab or Procedure External Lab or Procedure   Cholesterol Screening Recommended screening varies with age, risk and gender LDL Cholesterol (mg/dL)   Date Value   01/29/2025 142 (H)     LDL-CHOLESTEROL (mg/dL (calc))   Date Value   05/15/2024 79       Diabetes Screening  If history of high blood pressure or other  risk factors HEMOGLOBIN A1c (% of total Hgb)   Date Value   05/15/2024 6.0 (H)     HgbA1C (%)   Date Value   01/29/2025 6.3 (H)     Glucose (mg/dL)   Date Value   01/29/2025 85     GLUCOSE (mg/dL)   Date Value   05/15/2024 102 (H)         Gonorrhea Screening If high risk No results found for: \"GONOCOCCUS\"    HIV Screening For all adults age 18-65, older adults at increased risk No results found for: \"HIV\"    Syphilis Screening Screen if pregnant or high risk No results found for: \"RPR\"    Hepatitis C Screening Screen those at high risk plus screen one time for adults born 1947-1 965 No results found for: \"HCVAB\"    Tuberculosis Screen If high risk No components found for: \"PPDINDURAT\"      Disease Monitoring:    SPECIFIC DISEASE MONITORING Internal Lab or Procedure External Lab or Procedure   Annual Monitoring of Persistent     Medications (ACE/ARB, digoxin, diuretics)    Potassium  Annually Potassium (mmol/L)   Date Value   01/29/2025 4.6     POTASSIUM (mmol/L)   Date Value   05/15/2024 4.5         No data to display                Creatinine  Annually CREATININE (mg/dL)   Date Value   05/15/2024 1.06     Creatinine (mg/dL)   Date Value   01/29/2025 1.10         No data to display                Digoxin Serum Conc  Annually No results found for: \"DIGOXIN\"      No data to display                Diabetes      HgbA1C  Annually HEMOGLOBIN A1c (% of total Hgb)   Date Value   05/15/2024 6.0 (H)     HgbA1C (%)   Date Value    01/29/2025 6.3 (H)         No data to display                Creat/alb ratio  Annually CREATININE (mg/dL)   Date Value   05/15/2024 1.06     Creatinine (mg/dL)   Date Value   01/29/2025 1.10        LDL  Annually LDL Cholesterol (mg/dL)   Date Value   01/29/2025 142 (H)     LDL-CHOLESTEROL (mg/dL (calc))   Date Value   05/15/2024 79         No data to display                 Dilated Eye exam  Annually      No data to display                   No data to display                Asthma  (Annually between Nov. 1 & Dec. 31)    Date of last AAP/ACT and counseling given on importance of controller meds.                   ALLERGIES:     Allergies   Allergen Reactions    Iodine (Topical) HIVES and RASH    Radiology Contrast Iodinated Dyes RASH       CURRENT MEDICATIONS:   Current Outpatient Medications   Medication Sig Dispense Refill    metoprolol succinate ER 50 MG Oral Tablet 24 Hr TAKE 1 TABLET(50 MG) BY MOUTH DAILY 90 tablet 0    atorvastatin 20 MG Oral Tab Take 1 tablet (20 mg total) by mouth nightly. 90 tablet 1    Omeprazole 40 MG Oral Capsule Delayed Release 1 tab po q am on empty stomach 45mins AC breakfast 30 capsule 1    Acetaminophen 500 MG Oral Cap Take 2 capsules (1,000 mg total) by mouth every 8 (eight) hours.        MEDICAL INFORMATION:   Past Medical History:    Arrhythmia    SVT    Decorative tattoo    GERD without esophagitis    Heart palpitations    High blood pressure    History of pneumonia    Obstructive apnea    Edward SPLIT AHI 76 autoPAP 10-20 [employer]    Pneumonia    Sleep apnea    SVT (supraventricular tachycardia) (HCC)    Weight loss      Past Surgical History:   Procedure Laterality Date    Upper gi egd, histology - jar(s): 6  11/2017    Dr Caldera- H pylori positive bx. severe gastritis      Family History   Problem Relation Age of Onset    Hypertension Father     Cancer Father         Cancer Colon     Colon Cancer Father     Diabetes Mother     Hypertension Mother     Cancer Sister 52         brain, liver    Diabetes Sister     Diabetes Sister     Breast Cancer Sister     Heart Disorder Sister     No Known Problems Daughter     No Known Problems Son     Heart Disorder Brother     No Known Problems Daughter     No Known Problems Daughter     No Known Problems Brother     Heart Disorder Sister     Stroke Brother         7-13 2022      SOCIAL HISTORY:   Social History     Socioeconomic History    Marital status: Single   Tobacco Use    Smoking status: Never     Passive exposure: Never    Smokeless tobacco: Never   Vaping Use    Vaping status: Never Used   Substance and Sexual Activity    Alcohol use: Yes     Alcohol/week: 6.0 standard drinks of alcohol     Types: 6 Cans of beer per week     Comment: on weekends    Drug use: No   Other Topics Concern    Caffeine Concern Yes     Comment: 2 per week monsters    Exercise Yes     Comment: walking/jogging pushups    Seat Belt Yes    Special Diet No    Stress Concern Yes    Weight Concern Yes     Social Drivers of Health      [facility]    Housing Stability     Occ:  : engaged       REVIEW OF SYSTEMS:   GENERAL: feels well otherwise  SKIN: denies any unusual skin lesions  EYES: denies blurred vision or double vision  HEENT: denies nasal congestion, sinus pain or ST  LUNGS: denies shortness of breath with exertion  CARDIOVASCULAR: denies chest pain on exertion  GI: denies abdominal pain, denies heartburn  : denies nocturia or changes in stream  MUSCULOSKELETAL: denies back pain  NEURO: denies headaches  PSYCHE: denies depression or anxiety  HEMATOLOGIC: denies hx of anemia  ENDOCRINE: denies thyroid history  ALL/ASTHMA: denies hx of allergy or asthma    EXAM:   /80   Pulse 73   Temp 97.7 °F (36.5 °C) (Temporal)   Resp 16   Ht 5' 6.9\" (1.699 m)   Wt 251 lb 6.4 oz (114 kg)   SpO2 97%   BMI 39.49 kg/m²   GENERAL: well developed, well nourished, in no apparent distress  SKIN: no  rashes, no suspicious lesions, left lateral periorbital area this is a 3 mm flesh-colored skin protectant projection  HEENT: atraumatic, normocephalic, ears clear bilateral, wearing mask   EYES:PERRLA, EOMI, normal optic disk, conjunctiva are clear  NECK: supple, no adenopathy, no bruits  CHEST: no chest tenderness  BREAST: no dominant or suspicious mass  LUNGS: clear to auscultation  CARDIO: RRR without murmur  GI: good BS's, no masses, HSM or tenderness  :declined-no conerns  RECTAL: declined- no concerns  MUSCULOSKELETAL: back is not tender, FROM of the back  EXTREMITIES: no cyanosis, clubbing or edema  NEURO: Oriented times three, cranial nerves are intact, motor and sensory are grossly intact    ASSESSMENT AND OTHER RELEVANT CHRONIC CONDITIONS:   Noman Reyes is a 49 year old male who presents for an Annual Health Assessment.     PLAN SUMMARY:   1. Annual physical exam  -Encourage Mediterranean diet  -Encouraged exercise 30 minutes to 60 minutes 3-5 times weekly 150-300min to prevent obesity and chronic disease and eliminate stress and its effect on the body.  -encouraged to continue not smoking  -safe sex practices - recommend condom use  -mammogram order given  -immunizations-needs hepatitis A/B #2.  Flu shot and COVID-19 booster recommended annually in fall- today  -Vitamin D3  2000 units daily recommended  -Needs 1000 mg of calcium daily for osteoporosis prevention discussed  -thin prep pap recommended every 3 years due 12/7/18  -mammogram order placed screening  - Hep A & Hep B (Twinrix) 18yrs & older [99763]    2. Primary hypertension  - COMP METABOLIC PANEL [38399] [Q]; Future  - COMP METABOLIC PANEL [30991] [Q]  Off metoprolol ran out  Restart metoprolol  Controlled  Encouraged 150-130mins aerobic exercise weekly  <2g Na diet daily and follow Mediterranean diet  Weight loss if overweight  Home b/p monitoring  goal <140/90- 85% of time, optimal 110-120/70-80 bring readings and cuff to each  visit.  Labs and OV q 6 months    3. Dyslipidemia  - atorvastatin 20 MG Oral Tab; Take 1 tablet (20 mg total) by mouth nightly.  Dispense: 90 tablet; Refill: 1  - LIPID PANEL [7600] [Q]; Future  - HEPATIC FUNCTION PANEL [90749][Q]; Future  - LIPID PANEL [7600] [Q]  - HEPATIC FUNCTION PANEL [94480][Q]  - Hemoglobin A1C; Future  - COMP METABOLIC PANEL [14045] [Q]; Future  - Hemoglobin A1C  - COMP METABOLIC PANEL [29932] [Q]  The patient's ASCVD 10 year risk score is 11.5.  Uncontrolled  Ran out of medication  encourage mediterranean diet  Exercise brisk walk 30-60 mins at least 5 days weekly  Lose weight if overweight  Recheck fasting labs in 6 weeks if controlled on medication -repeat in 6 months 8/2025    4. Elevated ALT measurement  - HEPATIC FUNCTION PANEL [52312][Q]; Future  - HEPATIC FUNCTION PANEL [44938][Q]  Fatty liver  Will monitor    5. Prediabetes  - start metFORMIN 500 MG Oral Tab; Take 2 tablets (1,000 mg total) by mouth daily with breakfast.  Dispense: 180 tablet; Refill: 1  - Lipid Panel; Future  - COMP METABOLIC PANEL [08637] [Q]; Future  - Lipid Panel  - COMP METABOLIC PANEL [09896] [Q]  Risk, benefits and side effects of metformin discussed including abdominal bloating, nausea, flatulence, indigestion, loss of appetite, metallic taste, cramping, diarrhea, rare risk of lactic acidosis risk and hepatotoxicity.  .start exercising daily 30-60min  -low carb and low sugar  Recheck in 6 mos - Hemoglobin A1C; Future    6. GERD without esophagitis  Controlled  Stopped eating 3 hr prior to bedtime and avoiding trigger spicy food  Denies epigastric pain, black stool, blood in stool, dysphagia    7. SVT (supraventricular tachycardia) (HCC)  - metoprolol succinate ER 50 MG Oral Tablet 24 Hr; Take 1 tablet (50 mg total) by mouth daily.  Dispense: 90 tablet; Refill: 1  Continue cpap- FU with pulmonary to recheck apnea  Asymptomatic    8. Family history of colon cancer in father  UTD colonoscopy  Up-to-date on  colonoscopy completed 4/7/2023-normal  Father diagnosed at 65  Per GI recall is 10 years.    9. Severe obstructive sleep apnea  - Pulmonary Referral - In Network  Continue cpap- FU with pulmonary to recheck apnea  Compliant with cpap    10. 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     11. Need for vaccination  - Hep A & Hep B (Twinrix) 18yrs & older [63789]      The patient indicates understanding of these issues and agrees to the plan.  Return in about 6 months (around 8/10/2025) for complete labs fasting in 6 weeks, follow up in 8/2025 for medication monitoring and discuss labs.    Diet counseling perfomed  Exercise counseling perfomed    SUGGESTED VACCINATIONS - Influenza, Pneumococcal, Zoster, Tetanus     Immunization History   Administered Date(s) Administered    Covid-19 Vaccine Moderna 100 mcg/0.5 ml 03/05/2021, 04/05/2021    FLULAVAL 6 months & older 0.5 ml Prefilled syringe (36256) 12/12/2017, 11/01/2021    FLUZONE 6 months and older PFS 0.5 ml (19466) 12/12/2017    HEP A/HEP B Combined 04/23/2024    Influenza 10/15/2019    MMR 04/23/2024    Pneumococcal Conjugate PCV20 02/09/2023    Pneumovax 23 07/28/2021    Positive Measles Titer 12/12/2017    Positive Mumps Titer 12/12/2017    Positive Rubella Titer 12/12/2017    TDAP 12/12/2017    Varicella Deferred (Had Chicken Pox) 11/01/1995       Influenza Annually   Pneumococcal if high risk   Td/Tdap once then every 10 years   HPV Males 11-21   Zoster (Shingles) 60 and older: one dose    Varicella 2 doses if not immune   MMR 1-2 doses if born after 1958 and not immune

## 2025-02-10 NOTE — PATIENT INSTRUCTIONS
Contact about equipment error and schedule follow up with Dr.Flores JOHN VAUGHN (Routine) - Closed  Reason for Referral - JOHN VAUGHN (Routine) - Closed  Specialty Diagnoses / Procedures Referred By Contact Referred To Contact   Durable Medical Equipment Diagnoses   BRIT (obstructive sleep apnea)      Procedures   HUMIDIFIER, HEATED, USED WITH POSITIVE AIRWAY PRESSURE DEVICE   CPAP DEVICE  SaraGladis chávez, FNP-C   [address]    [address]   [place] [address]   Phone: [phone]   Fax: [phone]  [facility]   [address]   [place] [address]   Phone: [phone]   Fax: [phone]      Perform labs fasting 8 hours with water or black coffee or or black tea diet  soda only prior to exam.    -Encourage healthy diet of whole food and avoid processed food and sugary drinks and sodas.  Diet should include lean meats and vegetables including 5-7 servings of fruit and vegetables total in 1 day.  Never skip breakfast.  -Encouraged exercise 30 minutes or more 5 times weekly to prevent obesity and chronic disease and eliminate stress and its effect on the body. Total 150mins weekly or more.  -encouraged to continue not smoking  - recommend condom use per CDC recommendation for all  or unmarried couples  -  immunizations- annual influenza vaccine recommended  -Vitamin D3 1000- 2000 units daily recommended  -Needs 1000mg of calcium daily for osteoporosis prevention discussed. Need to ingest 1000mg of calcium daily to prevent osteoporosis later in life.

## 2025-06-08 ENCOUNTER — APPOINTMENT (OUTPATIENT)
Dept: CT IMAGING | Facility: HOSPITAL | Age: 50
End: 2025-06-08
Payer: COMMERCIAL

## 2025-06-08 ENCOUNTER — HOSPITAL ENCOUNTER (OUTPATIENT)
Facility: HOSPITAL | Age: 50
Setting detail: OBSERVATION
LOS: 1 days | Discharge: HOME | End: 2025-06-09
Attending: EMERGENCY MEDICINE | Admitting: STUDENT IN AN ORGANIZED HEALTH CARE EDUCATION/TRAINING PROGRAM
Payer: COMMERCIAL

## 2025-06-08 DIAGNOSIS — R41.0 EPISODE OF CONFUSION: Primary | ICD-10-CM

## 2025-06-08 DIAGNOSIS — R51.9 ACUTE NONINTRACTABLE HEADACHE, UNSPECIFIED HEADACHE TYPE: ICD-10-CM

## 2025-06-08 LAB
ALBUMIN SERPL-MCNC: 4.8 G/DL (ref 3.2–4.8)
ALBUMIN/GLOB SERPL: 1.3 {RATIO} (ref 1–2)
ALP LIVER SERPL-CCNC: 57 U/L (ref 45–117)
ALT SERPL-CCNC: 29 U/L (ref 10–49)
ANION GAP SERPL CALC-SCNC: 8 MMOL/L (ref 0–18)
AST SERPL-CCNC: 28 U/L (ref ?–34)
BASOPHILS # BLD AUTO: 0.02 X10(3) UL (ref 0–0.2)
BASOPHILS NFR BLD AUTO: 0.5 %
BILIRUB SERPL-MCNC: 0.3 MG/DL (ref 0.3–1.2)
BUN BLD-MCNC: 13 MG/DL (ref 9–23)
CALCIUM BLD-MCNC: 9.4 MG/DL (ref 8.7–10.6)
CHLORIDE SERPL-SCNC: 107 MMOL/L (ref 98–112)
CO2 SERPL-SCNC: 27 MMOL/L (ref 21–32)
CREAT BLD-MCNC: 1.37 MG/DL (ref 0.7–1.3)
EGFRCR SERPLBLD CKD-EPI 2021: 62 ML/MIN/1.73M2 (ref 60–?)
EOSINOPHIL # BLD AUTO: 0.11 X10(3) UL (ref 0–0.7)
EOSINOPHIL NFR BLD AUTO: 2.6 %
ERYTHROCYTE [DISTWIDTH] IN BLOOD BY AUTOMATED COUNT: 13.2 %
GLOBULIN PLAS-MCNC: 3.6 G/DL (ref 2–3.5)
GLUCOSE BLD-MCNC: 92 MG/DL (ref 70–99)
HCT VFR BLD AUTO: 43.5 % (ref 39–53)
HGB BLD-MCNC: 14.2 G/DL (ref 13–17.5)
IMM GRANULOCYTES # BLD AUTO: 0.01 X10(3) UL (ref 0–1)
IMM GRANULOCYTES NFR BLD: 0.2 %
LYMPHOCYTES # BLD AUTO: 2.44 X10(3) UL (ref 1–4)
LYMPHOCYTES NFR BLD AUTO: 58.1 %
MCH RBC QN AUTO: 28.4 PG (ref 26–34)
MCHC RBC AUTO-ENTMCNC: 32.6 G/DL (ref 31–37)
MCV RBC AUTO: 87 FL (ref 80–100)
MONOCYTES # BLD AUTO: 0.6 X10(3) UL (ref 0.1–1)
MONOCYTES NFR BLD AUTO: 14.3 %
NEUTROPHILS # BLD AUTO: 1.02 X10 (3) UL (ref 1.5–7.7)
NEUTROPHILS # BLD AUTO: 1.02 X10(3) UL (ref 1.5–7.7)
NEUTROPHILS NFR BLD AUTO: 24.3 %
OSMOLALITY SERPL CALC.SUM OF ELEC: 294 MOSM/KG (ref 275–295)
PLATELET # BLD AUTO: 224 10(3)UL (ref 150–450)
POTASSIUM SERPL-SCNC: 4.4 MMOL/L (ref 3.5–5.1)
PROT SERPL-MCNC: 8.4 G/DL (ref 5.7–8.2)
RBC # BLD AUTO: 5 X10(6)UL (ref 4.3–5.7)
SODIUM SERPL-SCNC: 142 MMOL/L (ref 136–145)
TROPONIN I SERPL HS-MCNC: <3 NG/L (ref ?–53)
WBC # BLD AUTO: 4.2 X10(3) UL (ref 4–11)

## 2025-06-08 PROCEDURE — 70450 CT HEAD/BRAIN W/O DYE: CPT

## 2025-06-08 PROCEDURE — 99223 1ST HOSP IP/OBS HIGH 75: CPT | Performed by: STUDENT IN AN ORGANIZED HEALTH CARE EDUCATION/TRAINING PROGRAM

## 2025-06-08 RX ORDER — SODIUM PHOSPHATE, DIBASIC AND SODIUM PHOSPHATE, MONOBASIC 7; 19 G/230ML; G/230ML
1 ENEMA RECTAL ONCE AS NEEDED
Status: DISCONTINUED | OUTPATIENT
Start: 2025-06-08 | End: 2025-06-09

## 2025-06-08 RX ORDER — POLYETHYLENE GLYCOL 3350 17 G/17G
17 POWDER, FOR SOLUTION ORAL DAILY PRN
Status: DISCONTINUED | OUTPATIENT
Start: 2025-06-08 | End: 2025-06-09

## 2025-06-08 RX ORDER — ONDANSETRON 2 MG/ML
4 INJECTION INTRAMUSCULAR; INTRAVENOUS EVERY 6 HOURS PRN
Status: DISCONTINUED | OUTPATIENT
Start: 2025-06-08 | End: 2025-06-09

## 2025-06-08 RX ORDER — BISACODYL 10 MG
10 SUPPOSITORY, RECTAL RECTAL
Status: DISCONTINUED | OUTPATIENT
Start: 2025-06-08 | End: 2025-06-09

## 2025-06-08 RX ORDER — ECHINACEA PURPUREA EXTRACT 125 MG
1 TABLET ORAL
Status: DISCONTINUED | OUTPATIENT
Start: 2025-06-08 | End: 2025-06-09

## 2025-06-08 RX ORDER — SODIUM CHLORIDE 9 MG/ML
125 INJECTION, SOLUTION INTRAVENOUS CONTINUOUS
Status: DISCONTINUED | OUTPATIENT
Start: 2025-06-08 | End: 2025-06-09

## 2025-06-08 RX ORDER — PROCHLORPERAZINE EDISYLATE 5 MG/ML
5 INJECTION INTRAMUSCULAR; INTRAVENOUS EVERY 8 HOURS PRN
Status: DISCONTINUED | OUTPATIENT
Start: 2025-06-08 | End: 2025-06-09

## 2025-06-08 RX ORDER — ASPIRIN 81 MG/1
324 TABLET, CHEWABLE ORAL ONCE
Status: COMPLETED | OUTPATIENT
Start: 2025-06-08 | End: 2025-06-08

## 2025-06-08 RX ORDER — ENOXAPARIN SODIUM 100 MG/ML
0.5 INJECTION SUBCUTANEOUS 2 TIMES DAILY
Status: DISCONTINUED | OUTPATIENT
Start: 2025-06-09 | End: 2025-06-09

## 2025-06-08 RX ORDER — ACETAMINOPHEN 500 MG
500 TABLET ORAL EVERY 4 HOURS PRN
Status: DISCONTINUED | OUTPATIENT
Start: 2025-06-08 | End: 2025-06-09

## 2025-06-08 RX ORDER — BENZONATATE 100 MG/1
200 CAPSULE ORAL 3 TIMES DAILY PRN
Status: DISCONTINUED | OUTPATIENT
Start: 2025-06-08 | End: 2025-06-09

## 2025-06-08 RX ORDER — SENNOSIDES 8.6 MG
17.2 TABLET ORAL NIGHTLY PRN
Status: DISCONTINUED | OUTPATIENT
Start: 2025-06-08 | End: 2025-06-09

## 2025-06-09 ENCOUNTER — APPOINTMENT (OUTPATIENT)
Dept: MRI IMAGING | Facility: HOSPITAL | Age: 50
End: 2025-06-09
Attending: STUDENT IN AN ORGANIZED HEALTH CARE EDUCATION/TRAINING PROGRAM
Payer: COMMERCIAL

## 2025-06-09 ENCOUNTER — NURSE ONLY (OUTPATIENT)
Dept: ELECTROPHYSIOLOGY | Facility: HOSPITAL | Age: 50
End: 2025-06-09
Attending: Other
Payer: COMMERCIAL

## 2025-06-09 VITALS
BODY MASS INDEX: 40.18 KG/M2 | DIASTOLIC BLOOD PRESSURE: 86 MMHG | HEART RATE: 63 BPM | HEIGHT: 66 IN | OXYGEN SATURATION: 96 % | RESPIRATION RATE: 18 BRPM | WEIGHT: 250 LBS | TEMPERATURE: 97 F | SYSTOLIC BLOOD PRESSURE: 128 MMHG

## 2025-06-09 PROBLEM — R41.0 CONFUSION: Status: ACTIVE | Noted: 2025-06-09

## 2025-06-09 LAB
AMPHET UR QL SCN: NEGATIVE
ANION GAP SERPL CALC-SCNC: 8 MMOL/L (ref 0–18)
ATRIAL RATE: 66 BPM
BENZODIAZ UR QL SCN: NEGATIVE
BUN BLD-MCNC: 13 MG/DL (ref 9–23)
CALCIUM BLD-MCNC: 9.1 MG/DL (ref 8.7–10.6)
CANNABINOIDS UR QL SCN: NEGATIVE
CHLORIDE SERPL-SCNC: 109 MMOL/L (ref 98–112)
CO2 SERPL-SCNC: 25 MMOL/L (ref 21–32)
COCAINE UR QL: NEGATIVE
CREAT BLD-MCNC: 1.02 MG/DL (ref 0.7–1.3)
CREAT UR-SCNC: 103 MG/DL
EGFRCR SERPLBLD CKD-EPI 2021: 89 ML/MIN/1.73M2 (ref 60–?)
FENTANYL UR QL SCN: NEGATIVE
GLUCOSE BLD-MCNC: 107 MG/DL (ref 70–99)
MDMA UR QL SCN: NEGATIVE
OPIATES UR QL SCN: NEGATIVE
OSMOLALITY SERPL CALC.SUM OF ELEC: 295 MOSM/KG (ref 275–295)
OXYCODONE UR QL SCN: NEGATIVE
P AXIS: 48 DEGREES
P-R INTERVAL: 150 MS
POTASSIUM SERPL-SCNC: 3.9 MMOL/L (ref 3.5–5.1)
Q-T INTERVAL: 378 MS
QRS DURATION: 84 MS
QTC CALCULATION (BEZET): 396 MS
R AXIS: 30 DEGREES
SODIUM SERPL-SCNC: 142 MMOL/L (ref 136–145)
T AXIS: 9 DEGREES
VENTRICULAR RATE: 66 BPM

## 2025-06-09 PROCEDURE — 99239 HOSP IP/OBS DSCHRG MGMT >30: CPT | Performed by: HOSPITALIST

## 2025-06-09 PROCEDURE — 70553 MRI BRAIN STEM W/O & W/DYE: CPT | Performed by: STUDENT IN AN ORGANIZED HEALTH CARE EDUCATION/TRAINING PROGRAM

## 2025-06-09 PROCEDURE — 99223 1ST HOSP IP/OBS HIGH 75: CPT | Performed by: OTHER

## 2025-06-09 RX ORDER — METOPROLOL SUCCINATE 50 MG/1
50 TABLET, EXTENDED RELEASE ORAL
Status: DISCONTINUED | OUTPATIENT
Start: 2025-06-09 | End: 2025-06-09

## 2025-06-09 RX ORDER — ATORVASTATIN CALCIUM 20 MG/1
20 TABLET, FILM COATED ORAL NIGHTLY
Status: DISCONTINUED | OUTPATIENT
Start: 2025-06-09 | End: 2025-06-09

## 2025-06-09 RX ORDER — GADOTERATE MEGLUMINE 376.9 MG/ML
20 INJECTION INTRAVENOUS
Status: COMPLETED | OUTPATIENT
Start: 2025-06-09 | End: 2025-06-09

## 2025-06-09 NOTE — CONSULTS
[facility]  [facility] Neurology Consult Note    Noman Reyes Patient Status:  Inpatient    1975 MRN [id number]   Location [facility] 7NE-A Attending Eron Cristobal MD   Hosp Day # 1 PCP Yuni Marsh DO     REASON FOR EVALUATION:  Confusion    HISTORY OF PRESENT ILLNESS:  Mr. Reyes is a 49-year-old man with hypertension, obesity and obstructive sleep apnea who came to the hospital because of an episode of confusion he had the other night.  A couple of days ago he had a very long day, and having to help his son through a very stressful time in the morning and then he worked a long shift that was particularly stressful.  He worked late into the night and, upon getting home, parked his car for or sell houses away and he felt confused and like he could not find where his home was.  He felt like he could not identify the sidewalk and he almost walked into the street before someone honked their horn which alerted him.  He was exhausted at the time and felt like he \"could not put it all together.\"  This lasted several minutes.  He was able to get into his home and he did not mention it to his family at the time but the more he thought about it the more he thought he should get checked out so he came to the emergency department the next day.  Something vaguely similar has happened in the past when he was \"crash dieting\" and he thinks he may have been hallucinating.  He otherwise denies any problems with vision, weakness or numbness.  He feels completely normal now.    Of note, his CPAP machine has been registering more frequent apnea episodes and he is unsure why this is happening.  He is not sure if he has gained or lost any weight recently.    PAST MEDICAL HISTORY:  Past Medical History:    Arrhythmia    SVT    Decorative tattoo    GERD without esophagitis    Heart palpitations    High blood pressure    History of pneumonia    Obstructive apnea    [facility] SPLIT AHI 76 autoPAP 10-20 Premier    Pneumonia     Sleep apnea    SVT (supraventricular tachycardia) (HCC)    Weight loss       PAST SURGICAL HISTORY:  Past Surgical History:   Procedure Laterality Date    Upper gi egd, histology - jar(s): 6  11/2017    Dr Caldera- H pylori positive bx. severe gastritis       FAMILY HISTORY:  family history includes Breast Cancer in his sister; Cancer in his father; Cancer (age of onset: 52) in his sister; Colon Cancer in his father; Diabetes in his mother, sister, and sister; Heart Disorder in his brother, sister, and sister; Hypertension in his father and mother; No Known Problems in his brother, daughter, daughter, daughter, and son; Stroke in his brother.    SOCIAL HISTORY:   reports that he has never smoked. He has never been exposed to tobacco smoke. He has never used smokeless tobacco. He reports current alcohol use of about 6.0 standard drinks of alcohol per week. He reports that he does not use drugs.    ALLERGIES:  Allergies   Allergen Reactions    Iodine (Topical) HIVES and RASH    Radiology Contrast Iodinated Dyes RASH       MEDICATIONS:  No current outpatient medications on file.     Current Facility-Administered Medications   Medication Dose Route Frequency    atorvastatin (Lipitor) tab 20 mg  20 mg Oral Nightly    metoprolol succinate ER (Toprol XL) 24 hr tab 50 mg  50 mg Oral Daily Beta Blocker    sodium chloride 0.9% infusion  125 mL/hr Intravenous Continuous    enoxaparin (Lovenox) 60 MG/0.6ML SUBQ injection 60 mg  0.5 mg/kg Subcutaneous BID    acetaminophen (Tylenol Extra Strength) tab 500 mg  500 mg Oral Q4H PRN    melatonin tab 3 mg  3 mg Oral Nightly PRN    polyethylene glycol (PEG 3350) (Miralax) 17 g oral packet 17 g  17 g Oral Daily PRN    sennosides (Senokot) tab 17.2 mg  17.2 mg Oral Nightly PRN    bisacodyl (Dulcolax) 10 MG rectal suppository 10 mg  10 mg Rectal Daily PRN    fleet enema (Fleet) rectal enema 133 mL  1 enema Rectal Once PRN    ondansetron (Zofran) 4 MG/2ML injection 4 mg  4 mg Intravenous  Q6H PRN    prochlorperazine (Compazine) 10 MG/2ML injection 5 mg  5 mg Intravenous Q8H PRN    benzonatate (Tessalon) cap 200 mg  200 mg Oral TID PRN    glycerin-hypromellose- (Artificial Tears) 0.2-0.2-1 % ophthalmic solution 1 drop  1 drop Both Eyes QID PRN    sodium chloride (Saline Mist) 0.65 % nasal solution 1 spray  1 spray Each Nare Q3H PRN       REVIEW OF SYSTEMS:  A 10-point system was reviewed.  Pertinent positives and negatives are noted in HPI.      PHYSICAL EXAMINATION:  VITAL SIGNS: /81 (BP Location: Left arm)   Pulse 70   Temp 97.4 °F (36.3 °C) (Temporal)   Resp 18   Ht 66\"   Wt 250 lb (113.4 kg)   SpO2 97%   BMI 40.35 kg/m²   GENERAL:  Patient is a 49 year old male in no acute distress.  HEART:  Regular rate and rhythm.  SKIN: Warm, dry, no rashes  PSYCH: Normal mood, behavior, affect    Getting EEG leads placed when we visited.    NEUROLOGICAL:   Mental status: Oriented to person, place, and time  Speech & Language: Fluent, no dysarthria  Comprehension: Intact  Cranial Nerves: VFF, PERRL, EOMI, no nystagmus, facial sensation intact, face symmetric, tongue midline, shoulder shrug equal  Motor: tone, bulk, strength are normal in all flexors and extensors   Sensory: Intact to light touch in all limbs  Coordination: FTN intact  Tendon Reflexes: normal for habitus, no asymmetry  Gait: Deferred    DIAGNOSTIC DATA:  Labs:  Recent Labs   Lab 06/08/25 2104   RBC 5.00   HGB 14.2   HCT 43.5   MCV 87.0   MCH 28.4   MCHC 32.6   RDW 13.2   NEPRELIM 1.02*   WBC 4.2   .0         Recent Labs   Lab 06/08/25 2104 06/09/25  0616   GLU 92 107*   BUN 13 13   CREATSERUM 1.37* 1.02   EGFRCR 62 89   CA 9.4 9.1    142   K 4.4 3.9    109   CO2 27.0 25.0         IMAGING:  MRI BRAIN (W+WO) (CPT=70553)  Result Date: 6/9/2025  CONCLUSION:   1. No acute intracranial abnormality identified.  2. Minimal chronic microvascular ischemic changes in the cerebral white matter.   LOCATION:  [id number]    Dictated by (CST): Leroy Cruz MD on 6/09/2025 at 8:30 AM     Finalized by (CST): Leroy Cruz MD on 6/09/2025 at 8:32 AM       CT BRAIN OR HEAD (CPT=70450)  Result Date: 6/8/2025  CONCLUSION:  1. No acute intracranial hemorrhage. 2. If an acute infarct is of high clinical concern, recommend MRI of the brain for further evaluation.    LOCATION:  [facility]   Dictated by (CST): Maryjane Crane MD on 6/08/2025 at 9:56 PM     Finalized by (CST): Maryjane Crane MD on 6/08/2025 at 9:57 PM           ASSESSMENT:  Mr. Reyes is a 49-year-old man with hypertension, obesity and obstructive sleep apnea who likely experienced a mild transient encephalopathy in the setting of multiple physical and emotional stressors.    PLAN:  Principal Problem:    Episode of confusion  Active Problems:    Acute nonintractable headache, unspecified headache type  Routine EEG; if there are no epileptiform abnormalities then no further urgent neurological diagnostics indicated.    He was counseled on the importance of quality and regularity of sleep, nutrition, hydration and physical activity for general wellness.  We also discussed how, in spite of these, episodes like this can happen in the setting of major stressors.    He was encouraged to follow up with his sleep medicine physician to discuss the quality of his sleep, which contributes to his general wellness and propensity for these sorts of episodes.    We will follow up on the results of EEG and act accordingly but I anticipate he will be able to dismiss from the hospital today.  He can follow up with neurology to check in in 4 to 6 weeks.    Garret Christian MD

## 2025-06-09 NOTE — ED QUICK NOTES
Orders for admission, patient is aware of plan and ready to go upstairs. Any questions, please call ED RN Imani LEE at extension [phone].     Patient Covid vaccination status: Fully vaccinated     COVID Test Ordered in ED: None    COVID Suspicion at Admission: N/A    Running Infusions: Medication Infusions[1]     Mental Status/LOC at time of transport: A&Ox4    Other pertinent information:   CIWA score: N/A   NIH score:  N/A             [1]    sodium chloride 125 mL/hr (06/08/25 6466)

## 2025-06-09 NOTE — PROCEDURES
EEG REPORT;    Reason for Examination: Altered mental status    Technical Summary:   18 Channels of EEG and 1 Channel of EKG was performed utilizing internation 10/20 method.        Background Activity:   The background activity consisted of 8 Hz waveforms, reactive to eye opening/ external stimulation.    Abnormality:  Throughout the recording low voltage, polymorphic, 3 to 6 Hz slow activity was noted diffusely over both hemispheres    Activation:    Hyperventilation:   Not Performed.    Photic Stimulation:  Driving response seen.No       Sleep:  Stage I and II sleep seen.    Impression:  This is an abnormal EEG. No focal, lateralized or generalized epileptiform activity seen.   Mild diffuse slowing into delta and theta range was noted.  This constellation of findings can be seen in encephalopathy due to metabolic/toxic etiology, medication effects or diffuse cerebral injury.  Clinical correlation is recommended.        Salvador Santana MD  [facility].

## 2025-06-09 NOTE — PROGRESS NOTES
[facility]   part of [facility]     Hospitalist Progress Note     Noman Reyes Patient Status:  Inpatient    1975 MRN [id number]   Formerly [facility] 7NE-A Attending Eron Cristobal MD   Hosp Day # 1 PCP Yuni Marsh DO     Subjective:   Feels good     Objective:    Review of Systems:   A comprehensive review of systems was completed; pertinent positive and negatives stated in subjective.  Vital signs:  Temp:  [97.4 °F (36.3 °C)-98.4 °F (36.9 °C)] 98.4 °F (36.9 °C)  Pulse:  [65-84] 84  Resp:  [12-25] 16  BP: (117-161)/(72-94) 117/72  SpO2:  [96 %-99 %] 96 %  Physical Exam:    General: No acute distress    Respiratory: no wheezes, no rhonchi  Cardiovascular: S1, S2, RRR  Abdomen: Soft, NT/ND, +BS  Extremities: no edema    Diagnostic Data:    Labs:  Recent Labs   Lab 25  210   WBC 4.2   HGB 14.2   MCV 87.0   .0     Recent Labs   Lab 25  2104 25  0616   GLU 92 107*   BUN 13 13   CREATSERUM 1.37* 1.02   CA 9.4 9.1   ALB 4.8  --     142   K 4.4 3.9    109   CO2 27.0 25.0   ALKPHO 57  --    AST 28  --    ALT 29  --    BILT 0.3  --    TP 8.4*  --      Estimated Creatinine Clearance: 77.3 mL/min (based on SCr of 1.02 mg/dL).  No results for input(s): \"PTP\", \"INR\" in the last 168 hours.     Microbiology  No results found for this visit on 25.  Imaging: Reviewed in Epic.  Medications: Scheduled Medications[1]    Assessment & Plan:    #Confusion- improved.  -CT brain showing no acute process  -suspect brief confusion likely stress induced, given HA will r/o seizure vs complex migraine  -tylenol prn  -MRI unremarkable   -neurology c/s in ED  -EEG ordered      #elevated serum creatinine  -Cr 1.37: baseline 1.1 with GFR 81  -less than 1.5x elevation  -cont to monitor BMP  -strict I/O, avoid nephrotoxins  -IVF      #HLD  -cont home statin     #SVT  -cont home metoprolol     #pre-DM  -hold home metformin    DC planning pending Neuro and EEG           Supplementary Documentation:   Quality:  DVT Mechanical Prophylaxis:   SCDs,    DVT Pharmacologic Prophylaxis   Medication    enoxaparin (Lovenox) 60 MG/0.6ML SUBQ injection 60 mg                Code Status: Prior  Jauregui: No urinary catheter in place  Jauregui Duration (in days):   Central line:    UVALDO: 6/9/2025  At this point Mr. Reyes is expected to be discharge to: home     The 21st Century Cures Act makes medical notes like these available to patients in the interest of transparency. Please be advised this is a medical document. Medical documents are intended to carry relevant information, facts as evident, and the clinical opinion of the practitioner. The medical note is intended as peer to peer communication and may appear blunt or direct. It is written in medical language and may contain abbreviations or verbiage that are unfamiliar.                [1]    atorvastatin  20 mg Oral Nightly    metoprolol succinate ER  50 mg Oral Daily Beta Blocker    enoxaparin  0.5 mg/kg Subcutaneous BID

## 2025-06-09 NOTE — PLAN OF CARE
Assumed care 2300, 6/8, NOC. Admission complete, MRI screening complete, IVF, neuros stable, VSS, updated on plan of care, needs met, slept overnight

## 2025-06-09 NOTE — H&P
[facility]     Noman Reyes Patient Status:  Emergency    1975 MRN [id number]   Location [facility] EMERGENCY DEPARTMENT Attending Yo Berry DO   Hosp Day # 0 PCP Yuni Marsh DO     Chief Complaint: confusion, HA    Subjective:    History of Present Illness:     Noman Reyes is a 49 year old male with PMHx GERD/ HTN/ BRIT/ SVT/ HLD who presented to the hospital for confusion. He notes 2 days ago he was in a lot of stress after his son was arrested and then he had to work a 10 hr work shift. He parked his car 2 houses down and got out of the vehicle. He then notes some blurred vision and felt confused as to where he was and how to get home. He was able to get home eventually and went to bed. Since waking up he had no further episodes of confusion. He did have a headache for the past 3 days without nausea or photophobia/ phonophobia. He feels back to his baseline state of health.    History/Other:    Past Medical History:  Past Medical History[1]  Past Surgical History:   Past Surgical History[2]   Family History:   Family History[3]  Social History:    reports that he has never smoked. He has never been exposed to tobacco smoke. He has never used smokeless tobacco. He reports current alcohol use of about 6.0 standard drinks of alcohol per week. He reports that he does not use drugs.     Allergies: Allergies[4]    Medications:  Medications Ordered Prior to Encounter[5]    Review of Systems:   A comprehensive review of systems was completed.    Pertinent positives and negatives noted in the HPI.    Objective:   Physical Exam:    /74   Pulse 65   Temp 97.4 °F (36.3 °C) (Temporal)   Resp 12   Ht 5' 6\" (1.676 m)   Wt 250 lb (113.4 kg)   SpO2 98%   BMI 40.35 kg/m²   General: No acute distress, Alert  Respiratory: No rhonchi, no wheezes  Cardiovascular: S1, S2. Regular rate and rhythm  Abdomen: Soft, Non-tender, non-distended, positive bowel sounds  Neuro:  No new focal deficits  Extremities: No edema      Results:    Labs:      Labs Last 24 Hours:    Recent Labs   Lab 06/08/25  2104   RBC 5.00   HGB 14.2   HCT 43.5   MCV 87.0   MCH 28.4   MCHC 32.6   RDW 13.2   NEPRELIM 1.02*   WBC 4.2   .0       Recent Labs   Lab 06/08/25  2104   GLU 92   BUN 13   CREATSERUM 1.37*   EGFRCR 62   CA 9.4   ALB 4.8      K 4.4      CO2 27.0   ALKPHO 57   AST 28   ALT 29   BILT 0.3   TP 8.4*       Estimated Glomerular Filtration Rate: 62 mL/min/1.73m2 (result from lab).    Lab Results   Component Value Date    PT 13.0 04/19/2011    INR 1.01 04/19/2011       No results for input(s): \"TROP\", \"TROPHS\", \"CK\" in the last 168 hours.    No results for input(s): \"TROP\", \"PBNP\" in the last 168 hours.    No results for input(s): \"PCT\" in the last 168 hours.    Imaging: Imaging data reviewed in Epic.    Assessment & Plan:      #Confusion  -CT brain showing no acute process  -suspect brief confusion likely stress induced, given HA will r/o seizure vs complex migraine  -tylenol prn  -MRI pending  -neurology c/s in ED    #elevated serum creatinine  -Cr 1.37: baseline 1.1 with GFR 81  -less than 1.5x elevation  -cont to monitor BMP  -strict I/O, avoid nephrotoxins    #HLD  -cont home statin    #SVT  -cont home metoprolol    #pre-DM  -hold home metformin        Plan of care discussed with ED physician    Zohra Johnston DO    Supplementary Documentation:     The 21st Century Cures Act makes medical notes like these available to patients in the interest of transparency. Please be advised this is a medical document. Medical documents are intended to carry relevant information, facts as evident, and the clinical opinion of the practitioner. The medical note is intended as peer to peer communication and may appear blunt or direct. It is written in medical language and may contain abbreviations or verbiage that are unfamiliar.                                       [1]   Past Medical  History:   Arrhythmia    SVT    Decorative tattoo    GERD without esophagitis    Heart palpitations    High blood pressure    History of pneumonia    Obstructive apnea    [facility] SPLIT AHI 76 autoPAP 10-20 [facility]    Pneumonia    Sleep apnea    SVT (supraventricular tachycardia) (HCC)    Weight loss   [2]   Past Surgical History:  Procedure Laterality Date    Upper gi egd, histology - jar(s): 6  11/2017    Dr Caldera- H pylori positive bx. severe gastritis   [3]   Family History  Problem Relation Age of Onset    Hypertension Father     Cancer Father         Cancer Colon     Colon Cancer Father     Diabetes Mother     Hypertension Mother     Cancer Sister 52        brain, liver    Diabetes Sister     Diabetes Sister     Breast Cancer Sister     Heart Disorder Sister     No Known Problems Daughter     No Known Problems Son     Heart Disorder Brother     No Known Problems Daughter     No Known Problems Daughter     No Known Problems Brother     Heart Disorder Sister     Stroke Brother         7-13 2022   [4]   Allergies  Allergen Reactions    Iodine (Topical) HIVES and RASH    Radiology Contrast Iodinated Dyes RASH   [5]   No current facility-administered medications on file prior to encounter.     Current Outpatient Medications on File Prior to Encounter   Medication Sig Dispense Refill    metoprolol succinate ER 50 MG Oral Tablet 24 Hr Take 1 tablet (50 mg total) by mouth daily. 90 tablet 1    atorvastatin 20 MG Oral Tab Take 1 tablet (20 mg total) by mouth nightly. 90 tablet 1    metFORMIN 500 MG Oral Tab Take 2 tablets (1,000 mg total) by mouth daily with breakfast. (Patient taking differently: Take 1 tablet (500 mg total) by mouth daily with breakfast.) 180 tablet 1    Acetaminophen 500 MG Oral Cap Take 2 capsules (1,000 mg total) by mouth every 8 (eight) hours.

## 2025-06-09 NOTE — PLAN OF CARE
Assumed care at 0730  Orientated x4, NSR, RA   Denies pain     Nqshift; no new neuro deficits or confusion   MRI brain and EEG completed   Urine sample completed   Spouse at bedside   Up to hallway  Needs met     Plan for discharge home if cleared by consults         NURSING DISCHARGE NOTE    Discharged Home via Ambulatory.  Accompanied by Family member  Belongings Taken by patient/family.    All consults cleared for discharge   Patient educated on discharge paperwork   No further questions from patient

## 2025-06-09 NOTE — ED INITIAL ASSESSMENT (HPI)
Since last night at midnight pt states when getting out of car he felt confused, didn't know where he was at and wasn't sure what he was doing.  Today pt tried resting, but was concerned due to ongoing headaches today. No other symptoms today per pt. Pt speaking clearly, steady gait, no loss of balance, no facial drooping, no other concerns at this time

## 2025-06-09 NOTE — PAYOR COMM NOTE
--------------  ADMISSION REVIEW     Payor: [employer] [facility]/HMO/POS/EPO  Subscriber #:  [id number]  Authorization Number: [id number]    CHANGED TO OBSERVATION 6/9/25    ADMIT TO INPT STATUS 6/8/25       REVIEW DOCUMENTATION:     ED Provider Notes        ED Provider Notes signed by Yo Berry DO at 6/8/2025 10:25 PM       Author: Yo Berry DO Service: -- Author Type: Physician    Filed: 6/8/2025 10:25 PM Date of Service: 6/8/2025  9:54 PM Status: Signed    : Yo Berry DO (Physician)           Patient Seen in: [facility] Emergency Department  History  Chief Complaint   Patient presents with    Headache     Stated Complaint: c/o episode last night around 0000 of confusion when coming home from work, res*    This is a 49-year-old male presents emergency room for evaluation of episode of confusion.  Patient states yesterday around midnight he had returned home from work, had worked a long shift, states he was walking from his car to his home when he felt confused and could not remember where he was at, states he had trouble finding his way home, states he remembers turning around in his house was in front of him and he went inside, wife was there she reports he did not seem confused at the time, he remembers that symptoms did resolve as he walked in the house.  Patient denied ever having difficulty speaking, there was no slurred speech.  States that he has had mild dull intermittent headaches the last few days, has been under a lot of stress recently.  States yesterday his day started with having to  his son at the police station who got arrested.  Patient then had to go to work and had a stressful shift.  Patient states he was able to sleep last night, today he woke up was feeling well however as he thought about his symptoms throughout the day he became concerned and thought he should get checked out.  Patient denies any symptoms at this time.  Denies visual symptoms denies numbness  tingling weakness to extremities.  Denied ever having chest pain or shortness of breath or abdominal pain.  Denies drug or alcohol use.  Denies any fevers or chills.      Past Medical History:    Arrhythmia    SVT    Decorative tattoo    GERD without esophagitis    Heart palpitations    High blood pressure    History of pneumonia    Obstructive apnea    [place] SPLIT AHI 76 autoPAP 10-20 Premier    Pneumonia    Sleep apnea    SVT (supraventricular tachycardia) (HCC)    Weight loss     Past Surgical History:   Procedure Laterality Date    Upper gi egd, histology - jar(s): 6  11/2017    Dr Caldera- H pylori positive bx. severe gastritis     ED Triage Vitals [06/08/25 2012]   BP (!) 161/93   Pulse 72   Resp 18   Temp 97.4 °F (36.3 °C)   Temp src Temporal   SpO2 96 %   O2 Device None (Room air)     Current Vitals:   Vital Signs  BP: 136/74  Pulse: 65  Resp: 12  Temp: 97.4 °F (36.3 °C)  Temp src: Temporal  MAP (mmHg): 92    Physical Exam  GENERAL: Patient is awake, alert, well-appearing, in no acute distress.  HEENT: Pupils equal round reactive to light, extraocular muscles are intact, there is no scleral icterus.  Mucous membranes are slightly dry, oropharynx is clear, uvula midline.   Scalp is atraumatic.  NECK: Neck is supple, there is no nuchal rigidity.  No carotid bruits.  No masses.  Trachea midline.  No cervical lymphadenopathy.  HEART: Regular rate and rhythm, no murmurs.  LUNGS: Clear to auscultation bilaterally.  No Rales, no rhonchi, no wheezing, no stridor.  ABDOMEN: Soft, nondistended,non tender  EXTREMITIES: No peripheral edema, no calf tenderness  NEUROLOGIC EXAM: Tongue midline, no facial drooping, no ptosis, muscle strength +5/5 bilateral upper and lower extremities cerebellar finger to nose intact, no pronator drift, sensation intact.    Labs Reviewed   CBC WITH DIFFERENTIAL WITH PLATELET - Abnormal; Notable for the following components:       Result Value    Neutrophil Absolute Prelim 1.02 (*)      Neutrophil Absolute 1.02 (*)     All other components within normal limits   COMP METABOLIC PANEL (14) - Abnormal; Notable for the following components:    Creatinine 1.37 (*)     Total Protein 8.4 (*)     Globulin  3.6 (*)     All other components within normal limits   TROPONIN I HIGH SENSITIVITY - Normal   RAINBOW DRAW BLUE     EKG    Rate, intervals and axes as noted on EKG Report.  Rate: 66  Rhythm: Sinus Rhythm  Reading: Normal sinus rhythm, no ST elevation    Differential diagnosis before testing includes but not limited to transient global amnesia, complex migraine, intracranial mass, CVA/TIA, seizure, which is a medical condition that poses a threat to life/function    Radiographic images  I personally reviewed the radiographs and my individual interpretation shows CT brain no intracranial hemorrhage  I also reviewed the official reports that showed no acute hemorrhage    History obtained by external source  (EMS, family, law enforcement, )other sources of medical information included history also per wife as in HPI    Course of Events during Emergency Room Visit include IV established, patient placed on cardiac monitor and pulse ox.  CBC was unremarkable chemistry sodium 142 potassium 4.4 BUN 13 creatinine 1.37 glucose 92.  CT brain without acute findings.  On reevaluation patient is symptom-free, has normal neurologic exam.  Discussed with neurologist who did recommend admission for further evaluation including MRI, MRI to be done on the floor.  Discussed with hospitalist admission.  Neurologist also recommended giving patient aspirin which was performed in the ER.  Discussed all results and plan the patient agrees with plan.    Disposition and Plan     Clinical Impression:  1. Episode of confusion    2. Acute nonintractable headache, unspecified headache type               [facility]  History and Physical            Noman Reyes Patient Status:  Emergency    1975 MRN [id number]    Location [facility] EMERGENCY DEPARTMENT Attending Yo Berry DO   Hosp Day # 0 PCP Yuni Marsh DO      Chief Complaint: confusion, HA     Subjective:  History of Present Illness:      Noman Reyes is a 49 year old male with PMHx GERD/ HTN/ BRIT/ SVT/ HLD who presented to the hospital for confusion. He notes 2 days ago he was in a lot of stress after his son was arrested and then he had to work a 10 hr work shift. He parked his car 2 houses down and got out of the vehicle. He then notes some blurred vision and felt confused as to where he was and how to get home. He was able to get home eventually and went to bed. Since waking up he had no further episodes of confusion. He did have a headache for the past 3 days without nausea or photophobia/ phonophobia. He feels back to his baseline state of health.     History/Other:  Past Medical History:  [Past Medical History]     [Past Medical History]   Arrhythmia     SVT    Decorative tattoo    GERD without esophagitis    Heart palpitations    High blood pressure    History of pneumonia    Obstructive apnea     [place] SPLIT AHI 76 autoPAP 10-20 Premier    Pneumonia    Sleep apnea    SVT (supraventricular tachycardia) (HCC)    Weight loss    Past Surgical History:   [Past Surgical History]    [Past Surgical History]        Procedure Laterality Date    Upper gi egd, histology - jar(s): 6   11/2017     Dr Caldera- H pylori positive bx. severe gastritis      Family History:   [Family History]     [Family History]        Problem Relation Age of Onset    Hypertension Father      Cancer Father           Cancer Colon     Colon Cancer Father      Diabetes Mother      Hypertension Mother      Cancer Sister 52         brain, liver    Diabetes Sister      Diabetes Sister      Breast Cancer Sister      Heart Disorder Sister      No Known Problems Daughter      No Known Problems Son      Heart Disorder Brother      No Known Problems Daughter      No Known Problems Daughter       No Known Problems Brother      Heart Disorder Sister      Stroke Brother           7-13 2022         Allergies: [Allergies]    [Allergies]       Allergen Reactions    Iodine (Topical) HIVES and RASH    Radiology Contrast Iodinated Dyes RASH        Medications:  [Medications Ordered Prior to Encounter]    [Medications Ordered Prior to Encounter]  No current facility-administered medications on file prior to encounter.             Current Outpatient Medications on File Prior to Encounter   Medication Sig Dispense Refill    metoprolol succinate ER 50 MG Oral Tablet 24 Hr Take 1 tablet (50 mg total) by mouth daily. 90 tablet 1    atorvastatin 20 MG Oral Tab Take 1 tablet (20 mg total) by mouth nightly. 90 tablet 1    metFORMIN 500 MG Oral Tab Take 2 tablets (1,000 mg total) by mouth daily with breakfast. (Patient taking differently: Take 1 tablet (500 mg total) by mouth daily with breakfast.) 180 tablet 1    Acetaminophen 500 MG Oral Cap Take 2 capsules (1,000 mg total) by mouth every 8 (eight) hours.            Review of Systems:   A comprehensive review of systems was completed.    Pertinent positives and negatives noted in the HPI.     Objective:  Physical Exam:    /74   Pulse 65   Temp 97.4 °F (36.3 °C) (Temporal)   Resp 12   Ht 5' 6\" (1.676 m)   Wt 250 lb (113.4 kg)   SpO2 98%   BMI 40.35 kg/m²   General: No acute distress, Alert  Respiratory: No rhonchi, no wheezes  Cardiovascular: S1, S2. Regular rate and rhythm  Abdomen: Soft, Non-tender, non-distended, positive bowel sounds  Neuro: No new focal deficits  Extremities: No edema        Results:  Labs:        Labs Last 24 Hours:         Recent Labs   Lab 06/08/25  2104   RBC 5.00   HGB 14.2   HCT 43.5   MCV 87.0   MCH 28.4   MCHC 32.6   RDW 13.2   NEPRELIM 1.02*   WBC 4.2   .0             Recent Labs   Lab 06/08/25  2104   GLU 92   BUN 13   CREATSERUM 1.37*   EGFRCR 62   CA 9.4   ALB 4.8      K 4.4      CO2 27.0   ALKPHO 57   AST  28   ALT 29   BILT 0.3   TP 8.4*         Estimated Glomerular Filtration Rate: 62 mL/min/1.73m2 (result from lab).           Lab Results   Component Value Date     PT 13.0 04/19/2011     INR 1.01 04/19/2011         No results for input(s): \"TROP\", \"TROPHS\", \"CK\" in the last 168 hours.     No results for input(s): \"TROP\", \"PBNP\" in the last 168 hours.     No results for input(s): \"PCT\" in the last 168 hours.     Imaging: Imaging data reviewed in Epic.     Assessment & Plan:  #Confusion  -CT brain showing no acute process  -suspect brief confusion likely stress induced, given HA will r/o seizure vs complex migraine  -tylenol prn  -MRI pending  -neurology c/s in ED     #elevated serum creatinine  -Cr 1.37: baseline 1.1 with GFR 81  -less than 1.5x elevation  -cont to monitor BMP  -strict I/O, avoid nephrotoxins     #HLD  -cont home statin     #SVT  -cont home metoprolol     #pre-DM  -hold home metformin      MEDICATIONS ADMINISTERED IN LAST 1 DAY:  aspirin chewable tab 324 mg       Date Action Dose Route User    6/8/2025 2220 Given 324 mg Oral Dulce Maria Hogan RN          atorvastatin (Lipitor) tab 20 mg       Date Action Dose Route User    6/9/2025 0507 Given 20 mg Oral Bird Lafleur RN          enoxaparin (Lovenox) 60 MG/0.6ML SUBQ injection 60 mg       Date Action Dose Route User    6/9/2025 0934 Given 60 mg Subcutaneous (Right Lower Abdomen) Delicia Mosquera RN          gadoterate meglumine (Dotarem) 10 MMOL/20ML injection 20 mL       Date Action Dose Route User    6/9/2025 0818 Given 20 mL Intravenous Joann Ball          metoprolol succinate ER (Toprol XL) 24 hr tab 50 mg       Date Action Dose Route User    6/9/2025 0507 Given 50 mg Oral Bird Lafleur RN          sodium chloride 0.9% infusion       Date Action Dose Route User    6/9/2025 0109 New Bag 125 mL/hr Intravenous Bird Lafleur RN    6/8/2025 2224 New Bag 125 mL/hr Intravenous Dulce Maria Hogan RN

## 2025-06-09 NOTE — ED PROVIDER NOTES
Patient Seen in: [facility] Emergency Department        History  Chief Complaint   Patient presents with    Headache     Stated Complaint: c/o episode last night around 0000 of confusion when coming home from work, res*    Subjective:   HPI            This is a 49-year-old male presents emergency room for evaluation of episode of confusion.  Patient states yesterday around midnight he had returned home from work, had worked a long shift, states he was walking from his car to his home when he felt confused and could not remember where he was at, states he had trouble finding his way home, states he remembers turning around in his house was in front of him and he went inside, wife was there she reports he did not seem confused at the time, he remembers that symptoms did resolve as he walked in the house.  Patient denied ever having difficulty speaking, there was no slurred speech.  States that he has had mild dull intermittent headaches the last few days, has been under a lot of stress recently.  States yesterday his day started with having to  his son at the police station who got arrested.  Patient then had to go to work and had a stressful shift.  Patient states he was able to sleep last night, today he woke up was feeling well however as he thought about his symptoms throughout the day he became concerned and thought he should get checked out.  Patient denies any symptoms at this time.  Denies visual symptoms denies numbness tingling weakness to extremities.  Denied ever having chest pain or shortness of breath or abdominal pain.  Denies drug or alcohol use.  Denies any fevers or chills.      Objective:     Past Medical History:    Arrhythmia    SVT    Decorative tattoo    GERD without esophagitis    Heart palpitations    High blood pressure    History of pneumonia    Obstructive apnea    [place] SPLIT AHI 76 autoPAP 10-20 Premier    Pneumonia    Sleep apnea    SVT (supraventricular tachycardia) ([facility])     Weight loss              Past Surgical History:   Procedure Laterality Date    Upper gi egd, histology - jar(s): 6  11/2017    Dr Caldera- H pylori positive bx. severe gastritis                Social History     Socioeconomic History    Marital status: Single   Tobacco Use    Smoking status: Never     Passive exposure: Never    Smokeless tobacco: Never   Vaping Use    Vaping status: Never Used   Substance and Sexual Activity    Alcohol use: Yes     Alcohol/week: 6.0 standard drinks of alcohol     Types: 6 Cans of beer per week     Comment: on weekends    Drug use: No   Other Topics Concern    Caffeine Concern Yes     Comment: 2 per week monsters    Exercise Yes     Comment: walking/jogging pushups    Seat Belt Yes    Special Diet No    Stress Concern Yes    Weight Concern Yes     Social Drivers of Health     Food Insecurity: No Food Insecurity (2/10/2025)    NCSS - Food Insecurity     Worried About Running Out of Food in the Last Year: No     Ran Out of Food in the Last Year: No   Transportation Needs: No Transportation Needs (2/10/2025)    NCSS - Transportation     Lack of Transportation: No   Housing Stability: Not At Risk (2/10/2025)    NCSS - Housing/Utilities     Has Housing: Yes     Worried About Losing Housing: No     Unable to Get Utilities: No                                Physical Exam    ED Triage Vitals [06/08/25 2012]   BP (!) 161/93   Pulse 72   Resp 18   Temp 97.4 °F (36.3 °C)   Temp src Temporal   SpO2 96 %   O2 Device None (Room air)       Current Vitals:   Vital Signs  BP: 136/74  Pulse: 65  Resp: 12  Temp: 97.4 °F (36.3 °C)  Temp src: Temporal  MAP (mmHg): 92    Oxygen Therapy  SpO2: 98 %  O2 Device: None (Room air)            Physical Exam  GENERAL: Patient is awake, alert, well-appearing, in no acute distress.  HEENT: Pupils equal round reactive to light, extraocular muscles are intact, there is no scleral icterus.  Mucous membranes are slightly dry, oropharynx is clear, uvula midline.    Scalp is atraumatic.  NECK: Neck is supple, there is no nuchal rigidity.  No carotid bruits.  No masses.  Trachea midline.  No cervical lymphadenopathy.  HEART: Regular rate and rhythm, no murmurs.  LUNGS: Clear to auscultation bilaterally.  No Rales, no rhonchi, no wheezing, no stridor.  ABDOMEN: Soft, nondistended,non tender  EXTREMITIES: No peripheral edema, no calf tenderness  NEUROLOGIC EXAM: Tongue midline, no facial drooping, no ptosis, muscle strength +5/5 bilateral upper and lower extremities cerebellar finger to nose intact, no pronator drift, sensation intact.            ED Course  Labs Reviewed   CBC WITH DIFFERENTIAL WITH PLATELET - Abnormal; Notable for the following components:       Result Value    Neutrophil Absolute Prelim 1.02 (*)     Neutrophil Absolute 1.02 (*)     All other components within normal limits   COMP METABOLIC PANEL (14) - Abnormal; Notable for the following components:    Creatinine 1.37 (*)     Total Protein 8.4 (*)     Globulin  3.6 (*)     All other components within normal limits   TROPONIN I HIGH SENSITIVITY - Normal   RAINBOW DRAW BLUE     EKG    Rate, intervals and axes as noted on EKG Report.  Rate: 66  Rhythm: Sinus Rhythm  Reading: Normal sinus rhythm, no ST elevation                                MDM       Differential diagnosis before testing includes but not limited to transient global amnesia, complex migraine, intracranial mass, CVA/TIA, seizure, which is a medical condition that poses a threat to life/function    Radiographic images  I personally reviewed the radiographs and my individual interpretation shows CT brain no intracranial hemorrhage  I also reviewed the official reports that showed no acute hemorrhage    History obtained by external source  (EMS, family, law enforcement, )other sources of medical information included history also per wife as in HPI    Discussion of management (consult/physicians, social work, pharmacy,ect) neurology Cheng Mackenzie  Hospitalists Dr Johnston      Course of Events during Emergency Room Visit include IV established, patient placed on cardiac monitor and pulse ox.  CBC was unremarkable chemistry sodium 142 potassium 4.4 BUN 13 creatinine 1.37 glucose 92.  CT brain without acute findings.  On reevaluation patient is symptom-free, has normal neurologic exam.  Discussed with neurologist who did recommend admission for further evaluation including MRI, MRI to be done on the floor.  Discussed with hospitalist admission.  Neurologist also recommended giving patient aspirin which was performed in the ER.  Discussed all results and plan the patient agrees with plan.    Shared decision making was utilized           Disposition:    Admission  I have discussed with the patient the results of test, differential diagnosis, and treatment plan. They expressed clear understanding of these instructions and agrees to the plan provided.       Note to patient: The 21st Century Cures Act makes medical notes like these available to patients in the interest of transparency. However, this is a medical document intended as peer to peer communication. It is written in medical language and may contain abbreviations or verbiage that are unfamiliar. It may appear blunt or direct. Medical documents are intended to carry relevant information, facts as evident, and the clinical opinion of the practitioner.             Admission disposition: 6/8/2025 10:05 PM           MDM    Disposition and Plan     Clinical Impression:  1. Episode of confusion    2. Acute nonintractable headache, unspecified headache type         Disposition:  Admit  6/8/2025 10:05 pm    Follow-up:  No follow-up provider specified.        Medications Prescribed:  Current Discharge Medication List                Supplementary Documentation:         Hospital Problems       Present on Admission  Date Reviewed: 2/10/2025          ICD-10-CM Noted POA    * (Principal) Episode of confusion R41.0  6/8/2025 Unknown

## 2025-06-10 ENCOUNTER — PATIENT OUTREACH (OUTPATIENT)
Dept: CASE MANAGEMENT | Age: 50
End: 2025-06-10

## 2025-06-10 NOTE — PROGRESS NOTES
Transitional Care Management   Discharge Date: 25  Contact Date: 6/10/2025    Assessment:  TCM Initial Assessment    General:  Assessment completed with: Patient  Patient Subjective: Pt feeling better and says he had such a good experience during his stay.  Chief Complaint: Episode of confusion  Verify patient name and  with patient/ caregiver: Yes    Hospital Stay/Discharge:  Prior to leaving the hospital were your Discharge Instructions reviewed with you?: Yes  Did you receive a copy of your written Discharge Instructions?: Yes  Do you feel better or worse since you left the hospital or emergency department?: Better    Follow - Up Appointment:  Do you have a follow-up appointment?: No  Are there any barriers to getting to your follow-up appointment?: No    Home Health/DME:  Prior to leaving the hospital was Home Health (HH) arranged for you?: No     Prior to leaving the hospital or emergency department was Durable Medical Equipment (DME), medical supplies, or infusions arranged for you?: No  Are DME/medical supply/infusions needs identified by staff during this assessment?: No     Medications/Diet:       Were you given a different diet per your Discharge Instructions?: No     Questions/Concerns:  Do you have any questions or concerns that have not been discussed?: No         Follow-up Appointments:  Your appointments       Date & Time Appointment Department (Center)    Aug 11, 2025 10:00 AM CDT Exam - New Patient with Linh Huynh APRN [address] ([address])              [address]  [address] [place] [id number] [phone]            Transitional Care Clinic  Was TCC Ordered: No      Primary Care Provider (If no TCC appointment)  Does patient already have a PCP appointment scheduled? No  Care Manager Scheduled PCP office TCM appointment with  patient      Specialist  Does the patient have any other follow-up appointment(s) that need to be scheduled? Yes   -If yes: Care Manager reviewed upcoming specialist appointments with patient: Yes   -Does the patient need assistance scheduling appointment(s): No      Book By Date: 6/23/25

## 2025-06-10 NOTE — DISCHARGE SUMMARY
[facility]     Noman Reyes Patient Status:  Observation    1975 MRN [id number]   Location [facility] 7NE-A Attending No att. providers found   Hosp Day # 1 PCP Yuni Marsh DO     Date of Admission: 2025  Date of Discharge: 2025    Discharge Disposition: Home or Self Care    Admitting Diagnosis:   Acute nonintractable headache, unspecified headache type [R51.9]  Episode of confusion [R41.0]    Hospital Discharge Diagnoses:  #Confusion- improved.  -CT brain showing no acute process  -suspect brief confusion likely stress induced, given HA will r/o seizure vs complex migraine  -tylenol prn  -MRI unremarkable   -neurology c/s in ED  -EEG per Neuro      #elevated serum creatinine  -Cr 1.37: baseline 1.1 with GFR 81  -less than 1.5x elevation  -cont to monitor BMP  -strict I/O, avoid nephrotoxins  -IVF      #HLD  -cont home statin     #SVT  -cont home metoprolol     #pre-DM  -hold home metformin    Lace+ Score: 26  59-90 High Risk  29-58 Medium Risk  0-28   Low Risk.     Brief Synopsis: Patient evaluated by neurology service.  Symptoms improved.  Patient had MRI and EEG.  There is some slowing on EEG but no seizure-like activity.  Neurology cleared the patient for discharge and patient was discharged in stable condition.  Patient to follow-up with neurology as needed and primary care physician within 1 week.    Procedures during hospitalization:   EEG    Lab/Test results pending at Discharge:   None    Consultants:  Neurology    Discharge Medication List:     Discharge Medications        CONTINUE taking these medications        Instructions Prescription details   Acetaminophen 500 MG Caps      Take 2 capsules (1,000 mg total) by mouth every 8 (eight) hours.   Refills: 0     atorvastatin 20 MG Tabs  Commonly known as: Lipitor      Take 1 tablet (20 mg total) by mouth nightly.   Quantity: 90 tablet  Refills: 1     metFORMIN 500 MG Tabs  Commonly known as: Glucophage       Take 2 tablets (1,000 mg total) by mouth daily with breakfast.   Quantity: 180 tablet  Refills: 1     metoprolol succinate ER 50 MG Tb24  Commonly known as: Toprol XL      Take 1 tablet (50 mg total) by mouth daily.   Quantity: 90 tablet  Refills: 1              Follow-up appointment:   Yuni Marsh DO  [address]  [facility] [address] [phone]    Schedule an appointment as soon as possible for a visit in 1 week(s)      [address]  [address] [address] [phone]  Schedule an appointment as soon as possible for a visit in 4 week(s)  post hospital follow up, confusion      -----------------------------------------------------------------------------------------------  PATIENT DISCHARGE INSTRUCTIONS: See electronic chart    Eron Cristobal MD 6/10/2025    Time spent: 33 minutes

## 2025-07-01 ENCOUNTER — TELEPHONE (OUTPATIENT)
Dept: FAMILY MEDICINE CLINIC | Facility: CLINIC | Age: 50
End: 2025-07-01

## 2025-07-01 NOTE — TELEPHONE ENCOUNTER
Pt missed appointment today.  Dr. Marsh would like to know how Pt is doing and to reschedule hospital follow- up  Left message for Pt to call back

## 2025-07-01 NOTE — TELEPHONE ENCOUNTER
LMOM to call office back regarding NO SHOW STATUS for office visit on 7/1/25 with Dr aaron so we can reschedule appt. Informed our office has a $50.00 NO SHOW FEE POLICY, so we ask that you call at least 24 hours before your appt time. Informed was charged a $50.00 No Show fee.

## (undated) NOTE — LETTER
Date: 7/7/2021    Patient Name: Vincenzo Reed          To Whom it may concern: The above patient was seen at the [facility] for treatment of a medical condition.       The patient may return to work without limitations 7/11/2021        S

## (undated) NOTE — LETTER
09/17/18        [address] [phone]      Dear Marilin Ramirez,    [facility] records indicate that you have outstanding lab work and or testing that was ordered for you and has not yet been completed:             CMP       Lipid

## (undated) NOTE — Clinical Note
Hi Dr. Marsh, pt feeling better since discharge.  Has scheduled visit with you.  Thank you, Renetta

## (undated) NOTE — LETTER
12/11/19        [address] 71 [phone]      Dear Andree Record records indicate that you have outstanding lab work and or testing that was ordered for you and has not yet been completed:  Orders Placed This Encounter

## (undated) NOTE — LETTER
10/08/21        [address] [phone]      Dear Zachary Jarvis,    [address] records indicate that you have outstanding lab work and or testing that was ordered for you and has not yet been completed      HEPATIC FUNCTION PANEL

## (undated) NOTE — LETTER
07/15/19        [address]oman [address] 71 [phone]      Dear Zachary Jarvis,    [address] [facility] records indicate that you have outstanding lab work and or testing that was ordered for you and has not yet been completed:  Orders Placed This Encounter

## (undated) NOTE — LETTER
10/16/19        [address] [phone]      Dear Horacio Carbajal,    Our records indicate that you have outstanding lab work and or testing that was ordered for you and has not yet been completed:  Orders Placed This Encounter

## (undated) NOTE — LETTER
[facility] [address], [address]  Consent for Procedure/Sedation    Date:     Time:       1. I authorize the performance upon [employer] the following:  Electrophysiology study with radiofrequency catheter ablation.      2. I ________________________________    ___________________    Witness: _________________________      Date: ___________________    Printed: 2019   10:14 AM  Patient Name: Frederick Alcaraz        : 1975       Medical Record #: [id number]

## (undated) NOTE — ED AVS SNAPSHOT
Arethazee Florence   MRN: [id number]    Department:  [facility] Emergency Department   Date of Visit:  1/20/2018           Disclosure     Insurance plans vary and the physician(s) referred by the ER may not be covered by your plan.  Please contact you tell this physician (or your personal doctor if your instructions are to return to your personal doctor) about any new or lasting problems. The primary care or specialist physician will see patients referred from the [facility] Emergency Department.  Cisco Almonte

## (undated) NOTE — ED AVS SNAPSHOT
Claribel Owen   MRN: [id number]    Department:  [facility] Emergency Department   Date of Visit:  10/24/2019           Disclosure     Insurance plans vary and the physician(s) referred by the ER may not be covered by your plan.  Please contact yo tell this physician (or your personal doctor if your instructions are to return to your personal doctor) about any new or lasting problems. The primary care or specialist physician will see patients referred from the [facility] Emergency Department.  Shefali Denise

## (undated) NOTE — LETTER
January 9, 2018        Leatha Oppenheim  [address] [place] [phone]      Dear Ginette Benavides: We would like to inform you that your account has been charged $50 for not showing up to the office for a scheduled appointment.     According to our rec